# Patient Record
Sex: MALE | Race: OTHER | HISPANIC OR LATINO | URBAN - METROPOLITAN AREA
[De-identification: names, ages, dates, MRNs, and addresses within clinical notes are randomized per-mention and may not be internally consistent; named-entity substitution may affect disease eponyms.]

---

## 2017-12-01 ENCOUNTER — OUTPATIENT (OUTPATIENT)
Dept: OUTPATIENT SERVICES | Facility: HOSPITAL | Age: 67
LOS: 1 days | Discharge: HOME | End: 2017-12-01

## 2017-12-06 DIAGNOSIS — K75.2 NONSPECIFIC REACTIVE HEPATITIS: ICD-10-CM

## 2022-05-09 ENCOUNTER — APPOINTMENT (OUTPATIENT)
Dept: UROLOGY | Facility: CLINIC | Age: 72
End: 2022-05-09

## 2023-06-08 ENCOUNTER — APPOINTMENT (OUTPATIENT)
Dept: SURGERY | Facility: CLINIC | Age: 73
End: 2023-06-08
Payer: MEDICARE

## 2023-06-08 VITALS — SYSTOLIC BLOOD PRESSURE: 192 MMHG | DIASTOLIC BLOOD PRESSURE: 102 MMHG | HEART RATE: 66 BPM

## 2023-06-08 VITALS
HEIGHT: 63.5 IN | WEIGHT: 171 LBS | SYSTOLIC BLOOD PRESSURE: 201 MMHG | DIASTOLIC BLOOD PRESSURE: 101 MMHG | HEART RATE: 66 BPM | BODY MASS INDEX: 29.92 KG/M2

## 2023-06-08 DIAGNOSIS — E11.9 TYPE 2 DIABETES MELLITUS W/OUT COMPLICATIONS: ICD-10-CM

## 2023-06-08 DIAGNOSIS — E78.5 HYPERLIPIDEMIA, UNSPECIFIED: ICD-10-CM

## 2023-06-08 DIAGNOSIS — Z87.891 PERSONAL HISTORY OF NICOTINE DEPENDENCE: ICD-10-CM

## 2023-06-08 DIAGNOSIS — I10 ESSENTIAL (PRIMARY) HYPERTENSION: ICD-10-CM

## 2023-06-08 DIAGNOSIS — Z83.6 FAMILY HISTORY OF OTHER DISEASES OF THE RESPIRATORY SYSTEM: ICD-10-CM

## 2023-06-08 PROCEDURE — 99203 OFFICE O/P NEW LOW 30 MIN: CPT

## 2023-06-08 RX ORDER — SITAGLIPTIN AND METFORMIN HYDROCHLORIDE 50; 1000 MG/1; MG/1
TABLET, FILM COATED ORAL
Refills: 0 | Status: ACTIVE | COMMUNITY

## 2023-06-08 RX ORDER — LOSARTAN POTASSIUM 100 MG/1
TABLET, FILM COATED ORAL
Refills: 0 | Status: ACTIVE | COMMUNITY

## 2023-06-08 NOTE — PLAN
[FreeTextEntry1] : Mr. LIMA  was told significance of findings, options, risks and benefits were explained.  Informed consent for right inguinal hernia repair with mesh and umbilical hernia repair and potential risks, benefits and alternatives (surgical options were discussed including non-surgical options or the option of no surgery) to the planned surgery were discussed in depth.  All surgical options were discussed including non-surgical treatments.  He wishes to proceed with surgery.  We will plan for surgery on at the next available date, pending any required insurance pre-certification or pre-approval. He agrees to obtain any necessary pre-operative evaluations and testing prior to surgery.\par Patient advised to seek immediate medical attention with any acute change in symptoms or with the development of any new or worsening symptoms.  Patient's questions and concerns addressed to patient's satisfaction, and patient verbalized an understanding of the information discussed.\par \par

## 2023-06-08 NOTE — PHYSICAL EXAM
[Alert] : alert [Oriented to Person] : oriented to person [Oriented to Place] : oriented to place [Oriented to Time] : oriented to time [Calm] : calm [de-identified] : small reducible umbilical hernia,  reducible Right  inguinal hernia.  small left inguinal hernia  No penile discharge or lesions.  No scrotal swelling or discoloration. Testes descended bilaterally, smooth, without masses. Epididymis non-tender.

## 2023-06-08 NOTE — CONSULT LETTER
[Dear  ___] : Dear  [unfilled], [Consult Letter:] : I had the pleasure of evaluating your patient, [unfilled]. [Please see my note below.] : Please see my note below. [Consult Closing:] : Thank you very much for allowing me to participate in the care of this patient.  If you have any questions, please do not hesitate to contact me. [Sincerely,] : Sincerely, [FreeTextEntry3] : Ming Ordaz MD, FACS

## 2023-06-08 NOTE — ASSESSMENT
[FreeTextEntry1] : Impression: small reducible umbilical hernia- symptomatic \par   reducible Right  inguinal hernia - symptomatic \par   small left inguinal hernia - asymptomatic

## 2023-06-08 NOTE — HISTORY OF PRESENT ILLNESS
[de-identified] : Mr. ALESSANDRA LIMA is  a 73 year old male who was referred by Dr. Ellen Owusu with the chief complaint of having pain and swelling in his Right  groin.  He has had the condition for 3 months  and is worse when he is walking or standing.  He is stating that the  swelling  is getting bigger and symptomatic. He denies any fever or  night sweats. Appetite is good and weight is stable. \par  \par

## 2023-06-23 ENCOUNTER — OUTPATIENT (OUTPATIENT)
Dept: OUTPATIENT SERVICES | Facility: HOSPITAL | Age: 73
LOS: 1 days | End: 2023-06-23
Payer: COMMERCIAL

## 2023-06-23 VITALS
DIASTOLIC BLOOD PRESSURE: 84 MMHG | HEART RATE: 63 BPM | WEIGHT: 156.97 LBS | TEMPERATURE: 98 F | RESPIRATION RATE: 18 BRPM | SYSTOLIC BLOOD PRESSURE: 166 MMHG | OXYGEN SATURATION: 100 % | HEIGHT: 65 IN

## 2023-06-23 DIAGNOSIS — Z98.890 OTHER SPECIFIED POSTPROCEDURAL STATES: Chronic | ICD-10-CM

## 2023-06-23 DIAGNOSIS — K42.9 UMBILICAL HERNIA WITHOUT OBSTRUCTION OR GANGRENE: ICD-10-CM

## 2023-06-23 DIAGNOSIS — K59.00 CONSTIPATION, UNSPECIFIED: ICD-10-CM

## 2023-06-23 DIAGNOSIS — Z98.41 CATARACT EXTRACTION STATUS, RIGHT EYE: Chronic | ICD-10-CM

## 2023-06-23 DIAGNOSIS — K40.90 UNILATERAL INGUINAL HERNIA, WITHOUT OBSTRUCTION OR GANGRENE, NOT SPECIFIED AS RECURRENT: ICD-10-CM

## 2023-06-23 DIAGNOSIS — I10 ESSENTIAL (PRIMARY) HYPERTENSION: ICD-10-CM

## 2023-06-23 DIAGNOSIS — E11.40 TYPE 2 DIABETES MELLITUS WITH DIABETIC NEUROPATHY, UNSPECIFIED: ICD-10-CM

## 2023-06-23 DIAGNOSIS — E27.40 UNSPECIFIED ADRENOCORTICAL INSUFFICIENCY: ICD-10-CM

## 2023-06-23 DIAGNOSIS — E11.9 TYPE 2 DIABETES MELLITUS WITHOUT COMPLICATIONS: ICD-10-CM

## 2023-06-23 DIAGNOSIS — Z01.818 ENCOUNTER FOR OTHER PREPROCEDURAL EXAMINATION: ICD-10-CM

## 2023-06-23 DIAGNOSIS — M54.40 LUMBAGO WITH SCIATICA, UNSPECIFIED SIDE: ICD-10-CM

## 2023-06-23 LAB
ALBUMIN SERPL ELPH-MCNC: 4 G/DL — SIGNIFICANT CHANGE UP (ref 3.5–5)
ALP SERPL-CCNC: 68 U/L — SIGNIFICANT CHANGE UP (ref 40–120)
ALT FLD-CCNC: 24 U/L DA — SIGNIFICANT CHANGE UP (ref 10–60)
ANION GAP SERPL CALC-SCNC: 7 MMOL/L — SIGNIFICANT CHANGE UP (ref 5–17)
AST SERPL-CCNC: 20 U/L — SIGNIFICANT CHANGE UP (ref 10–40)
BILIRUB SERPL-MCNC: 0.8 MG/DL — SIGNIFICANT CHANGE UP (ref 0.2–1.2)
BUN SERPL-MCNC: 12 MG/DL — SIGNIFICANT CHANGE UP (ref 7–18)
CALCIUM SERPL-MCNC: 9.5 MG/DL — SIGNIFICANT CHANGE UP (ref 8.4–10.5)
CHLORIDE SERPL-SCNC: 96 MMOL/L — SIGNIFICANT CHANGE UP (ref 96–108)
CO2 SERPL-SCNC: 28 MMOL/L — SIGNIFICANT CHANGE UP (ref 22–31)
CREAT SERPL-MCNC: 0.84 MG/DL — SIGNIFICANT CHANGE UP (ref 0.5–1.3)
EGFR: 93 ML/MIN/1.73M2 — SIGNIFICANT CHANGE UP
GLUCOSE SERPL-MCNC: 124 MG/DL — HIGH (ref 70–99)
POTASSIUM SERPL-MCNC: 3.5 MMOL/L — SIGNIFICANT CHANGE UP (ref 3.5–5.3)
POTASSIUM SERPL-SCNC: 3.5 MMOL/L — SIGNIFICANT CHANGE UP (ref 3.5–5.3)
PROT SERPL-MCNC: 8 G/DL — SIGNIFICANT CHANGE UP (ref 6–8.3)
SODIUM SERPL-SCNC: 131 MMOL/L — LOW (ref 135–145)

## 2023-06-23 PROCEDURE — 36415 COLL VENOUS BLD VENIPUNCTURE: CPT

## 2023-06-23 PROCEDURE — G0463: CPT

## 2023-06-23 PROCEDURE — 80053 COMPREHEN METABOLIC PANEL: CPT

## 2023-06-23 RX ORDER — SODIUM CHLORIDE 9 MG/ML
3 INJECTION INTRAMUSCULAR; INTRAVENOUS; SUBCUTANEOUS EVERY 8 HOURS
Refills: 0 | Status: DISCONTINUED | OUTPATIENT
Start: 2023-06-28 | End: 2023-06-30

## 2023-06-23 NOTE — H&P PST ADULT - PROBLEM SELECTOR PLAN 8
Instructed to continue Gabapentin and take it with sips of water on day of surgery.   Follow-up with provider for management.

## 2023-06-23 NOTE — H&P PST ADULT - PROBLEM SELECTOR PLAN 4
Pt non adherent with medication.  Instructed to comply with medication, to continue antihypertensive med and take with sips of water on day of surgery.    Follow-up with PCP postop for management.

## 2023-06-23 NOTE — H&P PST ADULT - PROBLEM SELECTOR PLAN 3
Pt on Hydrocortisone 20 mg daily-  Last Sodium level 131, chloride 96, Potassium 3.5 on 6/23/2023-Na level increased from 128 of 6/2/2023.  Instructed to continue medication and to take it the morning of surgery.  As per PCP, he is optimized for surgery.

## 2023-06-23 NOTE — H&P PST ADULT - PROBLEM SELECTOR PLAN 1
Pt is scheduled for right inguinal hernia repair with mesh on 6/28/2023.   TATYANA stop bang score 4. Pt denies history of TATYANA, never did sleep study.  Preoperative instructions discussed with pt via Slovenian speaking  Luis M Mcmullen ID#[id number]. Kiswahili copy of instructions given to pt.   Instructed pt to notify security when he arrives in the lobby of the hospital that he is here for surgery, that he will need someone to come to the hospital to pick him up after surgery, not to eat or drink anything after midnight the night before the surgery, to avoid NSAIDs such as Ibuprofen, Motrin, Aleve, Advil, naproxen before surgery, to take Tylenol if needed for pain, to report if he has been exposed to anyone with any contagious diseases including Covid-19 and Monkeypox or if he is exhibiting any symptoms of COVID-19 or has any rash or if he is exhibiting any symptoms of COVID-19.    Instructed about use of Chlorhexidine 4% soap for 3 days before surgery including the morning of the surgery to prevent infection. Verbalized understanding of instructions given.

## 2023-06-23 NOTE — H&P PST ADULT - PSYCHIATRIC
details… normal/normal affect/alert and oriented x3/normal behavior normal/alert and oriented x3/normal behavior/anxious

## 2023-06-23 NOTE — H&P PST ADULT - ENDOCRINE COMMENTS
Diabetes on Janumet-last HgA1C 6.4; adrenal hypofunction on Hydrocortisone Diabetes on Janumet-last HgA1C 6.4; adrenal hypofunction on Hydrocortisone 20 mg daily-last  on 6/2/2023-

## 2023-06-23 NOTE — H&P PST ADULT - NEGATIVE ALLERGY TYPES
no indoor environmental allergies/no reactions to food/no reactions to animals/no reactions to insect bites

## 2023-06-23 NOTE — H&P PST ADULT - NSICDXPROCEDURE_GEN_ALL_CORE_FT
PROCEDURES:  Repair, hernia, inguinal, open, using mesh, adult 23-Jun-2023 15:31:45  Nicolette Kidd  Laparoscopic repair of inguinal hernia with repair of umbilical hernia 23-Jun-2023 15:36:27  Nicolette Kidd

## 2023-06-23 NOTE — H&P PST ADULT - NSICDXPASTMEDICALHX_GEN_ALL_CORE_FT
PAST MEDICAL HISTORY:  Chronic radicular pain of lower back     CN (constipation)     Diabetic neuropathy     DM (diabetes mellitus)     HTN (hypertension)     Lumbar herniated disc     Non-recurrent unilateral inguinal hernia without obstruction or gangrene     Sciatica of left side     Seasonal allergies     Umbilical hernia without obstruction or gangrene      PAST MEDICAL HISTORY:  Adrenal hypofunction     Chronic radicular pain of lower back     CN (constipation)     Diabetic neuropathy     DM (diabetes mellitus)     HTN (hypertension)     Lumbar herniated disc     Non-recurrent unilateral inguinal hernia without obstruction or gangrene     Sciatica of left side     Seasonal allergies     Umbilical hernia without obstruction or gangrene

## 2023-06-23 NOTE — H&P PST ADULT - PROBLEM SELECTOR PLAN 5
Pt on Janumet-last HgA1C 6.4 ON 6/2/2023.  Instructed to continue antidiabetic med-Janumet and to hold it on day of surgery.   Perioperative glucose monitoring and coverage as needed.   Follow-up with PCP for diabetic management.

## 2023-06-23 NOTE — H&P PST ADULT - HISTORY OF PRESENT ILLNESS
This is a 73 year old  male with PMH of presents with c/o intermittent right inguinal pain and due to hernia. Pt reports worsening of pain with activity. pt also reports umbilical repaair. Pt denies any discomfort. Pt for right inguinal hernia repair with mesh and umbilical hernia repair on 6/28/2023.  This is a 73 year old  male with PMH of presents with c/o intermittent right inguinal pain and due to hernia. Pt reports worsening of pain with activity. Pt also reports umbilical hernia. Pt denies any discomfort. Pt is scheduled for right inguinal hernia repair with mesh and umbilical hernia repair on 6/28/2023.  This is a 73 year old  male with PMH of Hypertension, Diabetes on Janumet-last HgA1C unknown, diabetic neuropathy, adrenal hypofunction on Hydrocortisone 20 mg daily-last Sodium level 131, chloride 96, Potassium 3.5 on 6/23/2023-Na level increased from 128 of 6/2/2023, constipation,  lumbar herniated discs with radiculopathy, seasonal allergies, presents with c/o intermittent right inguinal pain due to hernia. Pt reports worsening of pain with activity. Pt also reports umbilical hernia. Pt denies any discomfort. Pt is scheduled for right inguinal hernia repair with mesh and umbilical hernia repair on 6/28/2023.

## 2023-06-23 NOTE — H&P PST ADULT - NSICDXFAMILYHX_GEN_ALL_CORE_FT
FAMILY HISTORY:  Father  Still living? No  Family history of lung disease, Age at diagnosis: Age Unknown    Mother  Still living? No  Family history of cardiomegaly, Age at diagnosis: Age Unknown    Sibling  Still living? Unknown  History of hypertension in sibling, Age at diagnosis: Age Unknown

## 2023-06-23 NOTE — H&P PST ADULT - NSICDXPASTSURGICALHX_GEN_ALL_CORE_FT
PAST SURGICAL HISTORY:  H/O hemorrhoidectomy      PAST SURGICAL HISTORY:  H/O bilateral cataract extraction     H/O hemorrhoidectomy     H/O: hemorrhoidectomy     History of circumcision     History of testicular mass excision     History of varicose vein stripping

## 2023-06-28 ENCOUNTER — INPATIENT (INPATIENT)
Facility: HOSPITAL | Age: 73
LOS: 1 days | Discharge: HOME | DRG: 351 | End: 2023-06-30
Attending: SPECIALIST | Admitting: SPECIALIST
Payer: COMMERCIAL

## 2023-06-28 ENCOUNTER — TRANSCRIPTION ENCOUNTER (OUTPATIENT)
Age: 73
End: 2023-06-28

## 2023-06-28 ENCOUNTER — APPOINTMENT (OUTPATIENT)
Dept: SURGERY | Facility: HOSPITAL | Age: 73
End: 2023-06-28

## 2023-06-28 VITALS
RESPIRATION RATE: 16 BRPM | SYSTOLIC BLOOD PRESSURE: 177 MMHG | DIASTOLIC BLOOD PRESSURE: 93 MMHG | HEART RATE: 90 BPM | TEMPERATURE: 98 F | OXYGEN SATURATION: 100 % | HEIGHT: 65 IN | WEIGHT: 156.97 LBS

## 2023-06-28 DIAGNOSIS — K40.90 UNILATERAL INGUINAL HERNIA, WITHOUT OBSTRUCTION OR GANGRENE, NOT SPECIFIED AS RECURRENT: ICD-10-CM

## 2023-06-28 DIAGNOSIS — Z98.890 OTHER SPECIFIED POSTPROCEDURAL STATES: Chronic | ICD-10-CM

## 2023-06-28 DIAGNOSIS — Z01.818 ENCOUNTER FOR OTHER PREPROCEDURAL EXAMINATION: ICD-10-CM

## 2023-06-28 DIAGNOSIS — Z98.41 CATARACT EXTRACTION STATUS, RIGHT EYE: Chronic | ICD-10-CM

## 2023-06-28 DIAGNOSIS — K42.9 UMBILICAL HERNIA WITHOUT OBSTRUCTION OR GANGRENE: ICD-10-CM

## 2023-06-28 LAB
ANION GAP SERPL CALC-SCNC: 11 MMOL/L — SIGNIFICANT CHANGE UP (ref 5–17)
BUN SERPL-MCNC: 13 MG/DL — SIGNIFICANT CHANGE UP (ref 7–18)
CALCIUM SERPL-MCNC: 9.3 MG/DL — SIGNIFICANT CHANGE UP (ref 8.4–10.5)
CHLORIDE SERPL-SCNC: 91 MMOL/L — LOW (ref 96–108)
CO2 SERPL-SCNC: 28 MMOL/L — SIGNIFICANT CHANGE UP (ref 22–31)
CREAT SERPL-MCNC: 0.87 MG/DL — SIGNIFICANT CHANGE UP (ref 0.5–1.3)
EGFR: 92 ML/MIN/1.73M2 — SIGNIFICANT CHANGE UP
GLUCOSE BLDC GLUCOMTR-MCNC: 147 MG/DL — HIGH (ref 70–99)
GLUCOSE BLDC GLUCOMTR-MCNC: 155 MG/DL — HIGH (ref 70–99)
GLUCOSE SERPL-MCNC: 161 MG/DL — HIGH (ref 70–99)
POTASSIUM SERPL-MCNC: 3.4 MMOL/L — LOW (ref 3.5–5.3)
POTASSIUM SERPL-SCNC: 3.4 MMOL/L — LOW (ref 3.5–5.3)
SODIUM SERPL-SCNC: 130 MMOL/L — LOW (ref 135–145)

## 2023-06-28 PROCEDURE — 49505 PRP I/HERN INIT REDUC >5 YR: CPT | Mod: AS,RT

## 2023-06-28 PROCEDURE — 49591 RPR AA HRN 1ST < 3 CM RDC: CPT | Mod: AS

## 2023-06-28 DEVICE — MESH HERNIA MARLEX 3 X 6": Type: IMPLANTABLE DEVICE | Site: RIGHT INGUINAL HERNIA REPAIR WITH MESH, UMBILICAL HERNIA REPAIR | Status: FUNCTIONAL

## 2023-06-28 RX ORDER — HYDROCORTISONE 20 MG
1 TABLET ORAL
Refills: 0 | DISCHARGE

## 2023-06-28 RX ORDER — HYDROMORPHONE HYDROCHLORIDE 2 MG/ML
1 INJECTION INTRAMUSCULAR; INTRAVENOUS; SUBCUTANEOUS
Refills: 0 | Status: DISCONTINUED | OUTPATIENT
Start: 2023-06-28 | End: 2023-06-28

## 2023-06-28 RX ORDER — ACETAMINOPHEN 500 MG
2 TABLET ORAL
Refills: 0 | DISCHARGE

## 2023-06-28 RX ORDER — ONDANSETRON 8 MG/1
4 TABLET, FILM COATED ORAL ONCE
Refills: 0 | Status: DISCONTINUED | OUTPATIENT
Start: 2023-06-28 | End: 2023-06-28

## 2023-06-28 RX ORDER — SOD,AMMONIUM,POTASSIUM LACTATE
1 CREAM (GRAM) TOPICAL
Refills: 0 | DISCHARGE

## 2023-06-28 RX ORDER — HYDROCORTISONE 20 MG
100 TABLET ORAL ONCE
Refills: 0 | Status: COMPLETED | OUTPATIENT
Start: 2023-06-28 | End: 2023-06-28

## 2023-06-28 RX ORDER — SODIUM CHLORIDE 9 MG/ML
1000 INJECTION, SOLUTION INTRAVENOUS
Refills: 0 | Status: DISCONTINUED | OUTPATIENT
Start: 2023-06-28 | End: 2023-06-28

## 2023-06-28 RX ORDER — GABAPENTIN 400 MG/1
1 CAPSULE ORAL
Refills: 0 | DISCHARGE

## 2023-06-28 RX ORDER — HYDROMORPHONE HYDROCHLORIDE 2 MG/ML
0.5 INJECTION INTRAMUSCULAR; INTRAVENOUS; SUBCUTANEOUS
Refills: 0 | Status: DISCONTINUED | OUTPATIENT
Start: 2023-06-28 | End: 2023-06-28

## 2023-06-28 RX ORDER — PREGABALIN 225 MG/1
1 CAPSULE ORAL
Refills: 0 | DISCHARGE

## 2023-06-28 RX ORDER — SITAGLIPTIN AND METFORMIN HYDROCHLORIDE 500; 50 MG/1; MG/1
1 TABLET, FILM COATED ORAL
Refills: 0 | DISCHARGE

## 2023-06-28 RX ORDER — IBUPROFEN 200 MG
400 TABLET ORAL EVERY 6 HOURS
Refills: 0 | Status: DISCONTINUED | OUTPATIENT
Start: 2023-06-28 | End: 2023-06-29

## 2023-06-28 RX ORDER — PLECANATIDE 3 MG/1
1 TABLET ORAL
Refills: 0 | DISCHARGE

## 2023-06-28 RX ORDER — OXYCODONE HYDROCHLORIDE 5 MG/1
1 TABLET ORAL
Qty: 5 | Refills: 0
Start: 2023-06-28

## 2023-06-28 RX ORDER — LOSARTAN POTASSIUM 100 MG/1
1 TABLET, FILM COATED ORAL
Refills: 0 | DISCHARGE

## 2023-06-28 RX ORDER — METOPROLOL TARTRATE 50 MG
1 TABLET ORAL
Refills: 0 | DISCHARGE

## 2023-06-28 RX ADMIN — HYDROMORPHONE HYDROCHLORIDE 0.5 MILLIGRAM(S): 2 INJECTION INTRAMUSCULAR; INTRAVENOUS; SUBCUTANEOUS at 14:49

## 2023-06-28 RX ADMIN — SODIUM CHLORIDE 3 MILLILITER(S): 9 INJECTION INTRAMUSCULAR; INTRAVENOUS; SUBCUTANEOUS at 14:25

## 2023-06-28 RX ADMIN — Medication 100 MILLIGRAM(S): at 14:25

## 2023-06-28 RX ADMIN — HYDROMORPHONE HYDROCHLORIDE 0.5 MILLIGRAM(S): 2 INJECTION INTRAMUSCULAR; INTRAVENOUS; SUBCUTANEOUS at 14:34

## 2023-06-28 RX ADMIN — SODIUM CHLORIDE 3 MILLILITER(S): 9 INJECTION INTRAMUSCULAR; INTRAVENOUS; SUBCUTANEOUS at 22:08

## 2023-06-28 NOTE — ASU DISCHARGE PLAN (ADULT/PEDIATRIC) - NS MD DC FALL RISK RISK
Med rec updated and complete. Allergies reviewed. Pt was unable to recall all her medications. Pt confirmed last doses taken. Placed call to family ( daughter ) to confirm medications. No antibiotic use in last 30 days.      Home pharmacy [facility] [phone]   For information on Fall & Injury Prevention, visit: [email or web address] OR  [email or web address] OR  https://www.cdc.gov/steadi/patient.html

## 2023-06-28 NOTE — BRIEF OPERATIVE NOTE - NSICDXBRIEFPOSTOP_GEN_ALL_CORE_FT
POST-OP DIAGNOSIS:  Right inguinal hernia 28-Jun-2023 14:05:51  Vidhi Antunez  Umbilical hernia 28-Jun-2023 14:05:45  Vidhi Antunez

## 2023-06-28 NOTE — ASU DISCHARGE PLAN (ADULT/PEDIATRIC) - ASU DC SPECIAL INSTRUCTIONSFT
Please follow-up with your surgeon in 1 week. Drink plenty of fluids and rest as needed. Call for any fever over 101, nausea, vomiting, severe pain, no passing of gas or bowel movement.     DIET   You may resume your regular diet as normal.     SURGICAL SITES   Remove outer dressing and keep white steri-strips in place allowing them to fall off on their own. You may shower 48 hours post-operatively but do not bathe or soak in the water for 1-2 weeks; pat dry. If you notice any signs of surgical site infection (ie. redness, swelling, pain, pus drainage), please seek medical care immediately.    ACTIVITY Do not lift anything heavier than 10 pounds for 2-3 months for hernia, no exercise for 2 weeks and avoid strenuous activity for 4-6 weeks.       HYDROCORTISONE   please take hydrocortisone as prescribed by PCP starting 6/29  200 mg IV hydrocortisone given 6/28 as per discussion with Dr. Ordaz and anesthesia   please follow up with your PCP 6/29 to discuss further planning     PAIN CONTROL   You may take Motrin 600mg (with food) or Tylenol 650mg as needed for mild pain. Stagger one medication 3 hours after the other for maximum pain control. Maximum daily dose of Tylenol should not exceed 4grams/day.  You may take your prescribed oxycodone for severe breakthrough pain not that is not relieved by Tylenol/Motrin. Do not drive or make important decisions while taking this medication and do not take more than 4 pills in 24 hours.Please refer to medical records/ progress notes for in depth hospital course. Discharge plan discussed and agreed with attending.

## 2023-06-28 NOTE — ASU PATIENT PROFILE, ADULT - PATIENT REPRESENTATIVE NAME
----- Message from Connie Barone MD sent at 8/8/2019  5:42 PM CDT -----  Call patient notify   Urine culture did grew out multiple organisms, but was consistent with contamination.  If still having symptoms, would like to repeat urine culture.  If symptoms are severe or significantly worsening please let me know.  Cholesterol is elevated at 261 with an LDL of 171, increased risk of heart disease.  The 10-year CVD risk score (D'Agostino, et al., 2008) is: 17.2%.  Would recommend starting on a statin to decrease cholesterol as well as decrease risk of heart disease and stroke.  Would recommend starting on atorvastatin 20 mg daily, please send script for number 30 and 6 refills to pharmacy of choice.  Would recommend repeating lipids in 6-8 weeks after starting medication.  Would also recommend low-fat low-cholesterol diet and exercise as well as weight loss.  Thyroid is normal.  Continue on current dose of levothyroxine.  Please send refill for 1 year to pharmacy of choice.  Magnesium level is elevated, uncertain why this is elevated.  Please find out if she is on any magnesium supplement, if she is she may want to decrease the dose.     Melissa spouse

## 2023-06-28 NOTE — BRIEF OPERATIVE NOTE - NSICDXBRIEFPREOP_GEN_ALL_CORE_FT
PRE-OP DIAGNOSIS:  Umbilical hernia 28-Jun-2023 14:05:28  Vidhi Antunez  Right inguinal hernia 28-Jun-2023 14:05:37  Vidhi Antunez

## 2023-06-28 NOTE — CHART NOTE - NSCHARTNOTEFT_GEN_A_CORE
Surgery called for pt near syncope episode in the bathroom of [facility].    Patient seen and examined at bed side.  Pt well appearing, resting comfortably in bed. Pt states he felt very dizzy and nearly fainted in the bathroom. Pts wife was there and she caught the patient, no fall or trauma to the head.  Pt states he is feeling much better now, he has no dizziness, weakness, or feeling like he is going to pass out.      Exam:  Constitutional: Well appearing, NAD  Respiratory: non-labored, no accessory muscle use  Cardiovascular: radial pulses palpated with pulses strong, regular  Skin: Non-diaphoretic  Abdomen: hernia surgical site C/D/I, abdomen soft, non distended, non tender to palpation  Extremities: without edema      Plan:   -likely vasovagal syncope  -admit for observation  -consult medicine, inputs appreciated  -IVF  -regular diet  -continue home meds

## 2023-06-28 NOTE — ASU PATIENT PROFILE, ADULT - NSICDXPASTMEDICALHX_GEN_ALL_CORE_FT
PAST MEDICAL HISTORY:  Adrenal hypofunction     Chronic radicular pain of lower back     CN (constipation)     Diabetic neuropathy     DM (diabetes mellitus)     HTN (hypertension)     Lumbar herniated disc     Non-recurrent unilateral inguinal hernia without obstruction or gangrene     Sciatica of left side     Seasonal allergies     Umbilical hernia without obstruction or gangrene

## 2023-06-28 NOTE — ASU PATIENT PROFILE, ADULT - NSICDXPASTSURGICALHX_GEN_ALL_CORE_FT
PAST SURGICAL HISTORY:  H/O bilateral cataract extraction     H/O hemorrhoidectomy     H/O: hemorrhoidectomy     History of circumcision     History of testicular mass excision     History of varicose vein stripping

## 2023-06-28 NOTE — PATIENT PROFILE ADULT - FALL HARM RISK - RISK INTERVENTIONS
Assistance OOB with selected safe patient handling equipment/Assistance with ambulation/Communicate Fall Risk and Risk Factors to all staff, patient, and family/Monitor gait and stability/Reinforce activity limits and safety measures with patient and family/Sit up slowly, dangle for a short time, stand at bedside before walking/Use of alarms - bed, chair and/or voice tab/Visual Cue: Yellow wristband/Bed in lowest position, wheels locked, appropriate side rails in place/Call bell, personal items and telephone in reach/Instruct patient to call for assistance before getting out of bed or chair/Non-slip footwear when patient is out of bed/[place] to call system/Physically safe environment - no spills, clutter or unnecessary equipment/Purposeful Proactive Rounding/Room/bathroom lighting operational, light cord in reach

## 2023-06-28 NOTE — ASU DISCHARGE PLAN (ADULT/PEDIATRIC) - CARE PROVIDER_API CALL
Ming Ordaz  Surgery  [address]  [place] [address]  Phone: [phone]  Fax: [phone]  Follow Up Time: 2 weeks

## 2023-06-28 NOTE — CHART NOTE - NSCHARTNOTEFT_GEN_A_CORE
Notified by RN that pt is bleeding from incision upon ambulating. Pt seen and examined at bedside. Dressing and steri strips completely saturated. Taken down. Incision appears well approximated, bleeding coagulated. Pt cleaned. Steri strips replaced. New pressure dressing applied with gauze and tape. Ice pack applied to the area. Pt denies abdominal pain, scrotal pain. Both testicles palpated. Pt encouraged to minimize activity for the next 2 weeks. Pt encouraged to utilize ice packs to the area. Discussed with Dr. Ordaz. Pt to remain in [facility] for 1 hour. Check incision dressing q15 minutes.

## 2023-06-28 NOTE — BRIEF OPERATIVE NOTE - NSICDXBRIEFPROCEDURE_GEN_ALL_CORE_FT
PROCEDURES:  Repair, hernia, inguinal, open, using mesh, adult 28-Jun-2023 14:04:34 RIGHT Vidhi Antunez  Repair, hernia, umbilical, with lipectomy 28-Jun-2023 14:05:07 without lipectomy Vidhi Antunez

## 2023-06-28 NOTE — ASU PATIENT PROFILE, ADULT - FALL HARM RISK - UNIVERSAL INTERVENTIONS
Bed in lowest position, wheels locked, appropriate side rails in place/Call bell, personal items and telephone in reach/Instruct patient to call for assistance before getting out of bed or chair/Non-slip footwear when patient is out of bed/[place] to call system/Physically safe environment - no spills, clutter or unnecessary equipment/Purposeful Proactive Rounding/Room/bathroom lighting operational, light cord in reach

## 2023-06-29 ENCOUNTER — TRANSCRIPTION ENCOUNTER (OUTPATIENT)
Age: 73
End: 2023-06-29

## 2023-06-29 DIAGNOSIS — N17.9 ACUTE KIDNEY FAILURE, UNSPECIFIED: ICD-10-CM

## 2023-06-29 DIAGNOSIS — R55 SYNCOPE AND COLLAPSE: ICD-10-CM

## 2023-06-29 DIAGNOSIS — E27.40 UNSPECIFIED ADRENOCORTICAL INSUFFICIENCY: ICD-10-CM

## 2023-06-29 DIAGNOSIS — E11.9 TYPE 2 DIABETES MELLITUS WITHOUT COMPLICATIONS: ICD-10-CM

## 2023-06-29 DIAGNOSIS — I10 ESSENTIAL (PRIMARY) HYPERTENSION: ICD-10-CM

## 2023-06-29 DIAGNOSIS — Z98.890 OTHER SPECIFIED POSTPROCEDURAL STATES: ICD-10-CM

## 2023-06-29 LAB
ANION GAP SERPL CALC-SCNC: 10 MMOL/L — SIGNIFICANT CHANGE UP (ref 5–17)
ANION GAP SERPL CALC-SCNC: 11 MMOL/L — SIGNIFICANT CHANGE UP (ref 5–17)
BUN SERPL-MCNC: 22 MG/DL — HIGH (ref 7–18)
BUN SERPL-MCNC: 32 MG/DL — HIGH (ref 7–18)
CALCIUM SERPL-MCNC: 8.4 MG/DL — SIGNIFICANT CHANGE UP (ref 8.4–10.5)
CALCIUM SERPL-MCNC: 8.7 MG/DL — SIGNIFICANT CHANGE UP (ref 8.4–10.5)
CHLORIDE SERPL-SCNC: 91 MMOL/L — LOW (ref 96–108)
CHLORIDE SERPL-SCNC: 93 MMOL/L — LOW (ref 96–108)
CO2 SERPL-SCNC: 27 MMOL/L — SIGNIFICANT CHANGE UP (ref 22–31)
CO2 SERPL-SCNC: 28 MMOL/L — SIGNIFICANT CHANGE UP (ref 22–31)
CREAT SERPL-MCNC: 1.55 MG/DL — HIGH (ref 0.5–1.3)
CREAT SERPL-MCNC: 2.45 MG/DL — HIGH (ref 0.5–1.3)
EGFR: 27 ML/MIN/1.73M2 — LOW
EGFR: 47 ML/MIN/1.73M2 — LOW
GLUCOSE BLDC GLUCOMTR-MCNC: 149 MG/DL — HIGH (ref 70–99)
GLUCOSE BLDC GLUCOMTR-MCNC: 172 MG/DL — HIGH (ref 70–99)
GLUCOSE SERPL-MCNC: 196 MG/DL — HIGH (ref 70–99)
GLUCOSE SERPL-MCNC: 223 MG/DL — HIGH (ref 70–99)
HCT VFR BLD CALC: 29 % — LOW (ref 39–50)
HGB BLD-MCNC: 10.6 G/DL — LOW (ref 13–17)
MAGNESIUM SERPL-MCNC: 2 MG/DL — SIGNIFICANT CHANGE UP (ref 1.6–2.6)
MCHC RBC-ENTMCNC: 31.5 PG — SIGNIFICANT CHANGE UP (ref 27–34)
MCHC RBC-ENTMCNC: 36.6 GM/DL — HIGH (ref 32–36)
MCV RBC AUTO: 86.1 FL — SIGNIFICANT CHANGE UP (ref 80–100)
NRBC # BLD: 0 /100 WBCS — SIGNIFICANT CHANGE UP (ref 0–0)
PHOSPHATE SERPL-MCNC: 4 MG/DL — SIGNIFICANT CHANGE UP (ref 2.5–4.5)
PLATELET # BLD AUTO: 183 K/UL — SIGNIFICANT CHANGE UP (ref 150–400)
POTASSIUM SERPL-MCNC: 3.9 MMOL/L — SIGNIFICANT CHANGE UP (ref 3.5–5.3)
POTASSIUM SERPL-MCNC: 3.9 MMOL/L — SIGNIFICANT CHANGE UP (ref 3.5–5.3)
POTASSIUM SERPL-SCNC: 3.9 MMOL/L — SIGNIFICANT CHANGE UP (ref 3.5–5.3)
POTASSIUM SERPL-SCNC: 3.9 MMOL/L — SIGNIFICANT CHANGE UP (ref 3.5–5.3)
RBC # BLD: 3.37 M/UL — LOW (ref 4.2–5.8)
RBC # FLD: 12.1 % — SIGNIFICANT CHANGE UP (ref 10.3–14.5)
SODIUM SERPL-SCNC: 129 MMOL/L — LOW (ref 135–145)
SODIUM SERPL-SCNC: 131 MMOL/L — LOW (ref 135–145)
WBC # BLD: 16.35 K/UL — HIGH (ref 3.8–10.5)
WBC # FLD AUTO: 16.35 K/UL — HIGH (ref 3.8–10.5)

## 2023-06-29 RX ORDER — IBUPROFEN 200 MG
1 TABLET ORAL
Qty: 0 | Refills: 0 | DISCHARGE
Start: 2023-06-29

## 2023-06-29 RX ORDER — SODIUM CHLORIDE 9 MG/ML
1000 INJECTION, SOLUTION INTRAVENOUS
Refills: 0 | Status: DISCONTINUED | OUTPATIENT
Start: 2023-06-29 | End: 2023-06-30

## 2023-06-29 RX ORDER — DEXTROSE 50 % IN WATER 50 %
25 SYRINGE (ML) INTRAVENOUS ONCE
Refills: 0 | Status: DISCONTINUED | OUTPATIENT
Start: 2023-06-29 | End: 2023-06-30

## 2023-06-29 RX ORDER — SODIUM CHLORIDE 9 MG/ML
500 INJECTION INTRAMUSCULAR; INTRAVENOUS; SUBCUTANEOUS ONCE
Refills: 0 | Status: COMPLETED | OUTPATIENT
Start: 2023-06-29 | End: 2023-06-29

## 2023-06-29 RX ORDER — GLUCAGON INJECTION, SOLUTION 0.5 MG/.1ML
1 INJECTION, SOLUTION SUBCUTANEOUS ONCE
Refills: 0 | Status: DISCONTINUED | OUTPATIENT
Start: 2023-06-29 | End: 2023-06-30

## 2023-06-29 RX ORDER — INSULIN LISPRO 100/ML
VIAL (ML) SUBCUTANEOUS
Refills: 0 | Status: DISCONTINUED | OUTPATIENT
Start: 2023-06-29 | End: 2023-06-30

## 2023-06-29 RX ORDER — DEXTROSE 50 % IN WATER 50 %
15 SYRINGE (ML) INTRAVENOUS ONCE
Refills: 0 | Status: DISCONTINUED | OUTPATIENT
Start: 2023-06-29 | End: 2023-06-30

## 2023-06-29 RX ORDER — HYDROCORTISONE 20 MG
20 TABLET ORAL DAILY
Refills: 0 | Status: DISCONTINUED | OUTPATIENT
Start: 2023-06-29 | End: 2023-06-30

## 2023-06-29 RX ORDER — DEXTROSE 50 % IN WATER 50 %
12.5 SYRINGE (ML) INTRAVENOUS ONCE
Refills: 0 | Status: DISCONTINUED | OUTPATIENT
Start: 2023-06-29 | End: 2023-06-30

## 2023-06-29 RX ADMIN — Medication 20 MILLIGRAM(S): at 18:12

## 2023-06-29 RX ADMIN — SODIUM CHLORIDE 250 MILLILITER(S): 9 INJECTION INTRAMUSCULAR; INTRAVENOUS; SUBCUTANEOUS at 16:50

## 2023-06-29 RX ADMIN — SODIUM CHLORIDE 3 MILLILITER(S): 9 INJECTION INTRAMUSCULAR; INTRAVENOUS; SUBCUTANEOUS at 13:51

## 2023-06-29 RX ADMIN — SODIUM CHLORIDE 3 MILLILITER(S): 9 INJECTION INTRAMUSCULAR; INTRAVENOUS; SUBCUTANEOUS at 21:17

## 2023-06-29 RX ADMIN — Medication 1: at 16:50

## 2023-06-29 NOTE — DISCHARGE NOTE PROVIDER - HOSPITAL COURSE
HPI:  73 year old  male with PMH of Hypertension, Diabetes on Janumet-last HgA1C unknown, diabetic neuropathy, adrenal hypofunction on Hydrocortisone 20 mg daily-last Sodium level 131, chloride 96, Potassium 3.5 on 6/23/2023-Na level increased from 128 of 6/2/2023, constipation,  lumbar herniated discs with radiculopathy, seasonal allergies, present to [facility] for elective right inguinal hernia repair and umbilical hernia repair. Pt reports worsening of pain with activity. Pt also reports umbilical hernia. Pt denies any discomfort. Pt is scheduled for right inguinal hernia repair with mesh and umbilical hernia repair on 6/28/2023.     Patient underwent right inguinal hernia repair and umbilical hernia repair on 6/28/23. The procedure was uncomplicated and well tolerated by the patient. The post-operative course was complicated with patient having incisional bleeding at the hernia repair site as well as near syncope episode. The patient was admitted to surgical team for observation, with resolution of all symptoms by the next morning. Diet was advanced as tolerated and according to bowel function, and pain was well controlled on medication. At the time of discharge, the patient was hemodynamically stable, was tolerating PO diet, was voiding urine, was ambulating, and was comfortable with adequate pain control. The patient was instructed to follow up with Dr. Ordaz within 1-2 weeks after discharge from the hospital. The patient/family felt comfortable with discharge. The patient is stable and ready for discharge to home. The patient had no other issues.

## 2023-06-29 NOTE — CONSULT NOTE ADULT - SUBJECTIVE AND OBJECTIVE BOX
Patient is a 73y old  Male who presents with a chief complaint of S/p RIH repair with incisional bleed and near-syncope (29 Jun 2023 09:19)      History of Present Illness	  This is a 73 year old  male with PMH of Hypertension, Diabetes on Janumet-last HgA1C unknown, diabetic neuropathy, adrenal hypofunction on Hydrocortisone 20 mg daily-last Sodium level 131, chloride 96, Potassium 3.5 on 6/23/2023-Na level increased from 128 of 6/2/2023, constipation,  lumbar herniated discs with radiculopathy, seasonal allergies, presents with c/o intermittent right inguinal pain due to hernia. Pt reports worsening of pain with activity. Pt also reports umbilical hernia. Pt denies any discomfort. Pt is scheduled for right inguinal hernia repair with mesh and umbilical hernia repair on 6/28/2023.   Awake, alert, comfortable in bed in NAD  INTERVAL HPI/OVERNIGHT EVENTS:  T(C): 36.9 (06-29-23 @ 05:50), Max: 37.1 (06-29-23 @ 00:18)  HR: 80 (06-29-23 @ 05:50) (67 - 100)  BP: 106/64 (06-29-23 @ 05:50) (105/71 - 177/93)  RR: 18 (06-29-23 @ 05:50) (11 - 19)  SpO2: 100% (06-29-23 @ 05:50) (97% - 100%)  Wt(kg): --  I&O's Summary    28 Jun 2023 07:01  -  29 Jun 2023 07:00  --------------------------------------------------------  IN: 900 mL / OUT: 400 mL / NET: 500 mL        PAST MEDICAL & SURGICAL HISTORY:  Umbilical hernia without obstruction or gangrene      Non-recurrent unilateral inguinal hernia without obstruction or gangrene      HTN (hypertension)      DM (diabetes mellitus)      Sciatica of left side      Chronic radicular pain of lower back      CN (constipation)      Seasonal allergies      Lumbar herniated disc      Diabetic neuropathy      Adrenal hypofunction      H/O hemorrhoidectomy      History of circumcision      H/O bilateral cataract extraction      H/O: hemorrhoidectomy      History of varicose vein stripping      History of testicular mass excision          SOCIAL HISTORY  Alcohol:  Tobacco:  Illicit substance use:      FAMILY HISTORY:      LABS:                        10.6   16.35 )-----------( 183      ( 29 Jun 2023 05:52 )             29.0     06-29    129<L>  |  91<L>  |  22<H>  ----------------------------<  196<H>  3.9   |  28  |  1.55<H>    Ca    8.7      29 Jun 2023 05:52  Phos  4.0     06-29  Mg     2.0     06-29        Urinalysis Basic - ( 29 Jun 2023 05:52 )    Color: x / Appearance: x / SG: x / pH: x  Gluc: 196 mg/dL / Ketone: x  / Bili: x / Urobili: x   Blood: x / Protein: x / Nitrite: x   Leuk Esterase: x / RBC: x / WBC x   Sq Epi: x / Non Sq Epi: x / Bacteria: x      CAPILLARY BLOOD GLUCOSE      POCT Blood Glucose.: 147 mg/dL (28 Jun 2023 13:54)  POCT Blood Glucose.: 155 mg/dL (28 Jun 2023 10:47)        Urinalysis Basic - ( 29 Jun 2023 05:52 )    Color: x / Appearance: x / SG: x / pH: x  Gluc: 196 mg/dL / Ketone: x  / Bili: x / Urobili: x   Blood: x / Protein: x / Nitrite: x   Leuk Esterase: x / RBC: x / WBC x   Sq Epi: x / Non Sq Epi: x / Bacteria: x        MEDICATIONS  (STANDING):  sodium chloride 0.9% lock flush 3 milliLiter(s) IV Push every 8 hours    MEDICATIONS  (PRN):  ibuprofen  Tablet. 400 milliGRAM(s) Oral every 6 hours PRN Moderate Pain (4 - 6)  oxycodone    5 mG/acetaminophen 325 mG 1 Tablet(s) Oral every 6 hours PRN Severe Pain (7 - 10)      REVIEW OF SYSTEMS:  CONSTITUTIONAL: No fever, weight loss, or fatigue  EYES: No eye pain, visual disturbances, or discharge  ENMT:  No difficulty hearing, tinnitus, vertigo; No sinus or throat pain  NECK: No pain or stiffness  RESPIRATORY: No cough, wheezing, chills or hemoptysis; No shortness of breath  CARDIOVASCULAR: No chest pain, palpitations, dizziness, or leg swelling  GASTROINTESTINAL: No abdominal or epigastric pain. No nausea, vomiting, or hematemesis; No diarrhea or constipation. No melena or hematochezia.  GENITOURINARY: No dysuria, frequency, hematuria, or incontinence  NEUROLOGICAL: No headaches, memory loss, loss of strength, numbness, or tremors  SKIN: No itching, burning, rashes, or lesions   LYMPH NODES: No enlarged glands  ENDOCRINE: No heat or cold intolerance; No hair loss  MUSCULOSKELETAL: No joint pain or swelling; No muscle, back, or extremity pain  PSYCHIATRIC: No depression, anxiety, mood swings, or difficulty sleeping  HEME/LYMPH: No easy bruising, or bleeding gums  ALLERY AND IMMUNOLOGIC: No hives or eczema    PHYSICAL EXAM:  GENERAL: NAD, well-groomed, well-developed  HEAD:  Atraumatic, Normocephalic  EYES: EOMI, PERRLA, conjunctiva and sclera clear  ENMT: No tonsillar erythema, exudates, or enlargement; Moist mucous membranes, Good dentition, No lesions  NECK: Supple, No JVD, Normal thyroid  NERVOUS SYSTEM:  Alert & Oriented X3, Good concentration; Motor Strength 5/5 B/L upper and lower extremities; DTRs 2+ intact and symmetric  CHEST/LUNG: Clear to percussion bilaterally; No rales, rhonchi, wheezing, or rubs  HEART: Regular rate and rhythm; No murmurs, rubs, or gallops  ABDOMEN: Soft, Nontender, Nondistended; Bowel sounds present  EXTREMITIES:  2+ Peripheral Pulses, No clubbing, cyanosis, or edema  LYMPH: No lymphadenopathy noted  SKIN: No rashes or lesions    RADIOLOGY & ADDITIONAL TESTS:    Imaging Personally Reviewed:  [ x] YES  [ ] NO    Consultant(s) Notes Reviewed:  [x ] YES  [ ] NO        Care Discussed with Consultants/Other Providers [ x] YES  [ ] NO

## 2023-06-29 NOTE — DISCHARGE NOTE PROVIDER - CARE PROVIDER_API CALL
Ming Ordaz  Surgery  [address] [facility], [address]  [place] [address]  Phone: [phone]  Fax: [phone]  Follow Up Time:

## 2023-06-29 NOTE — CHART NOTE - NSCHARTNOTEFT_GEN_A_CORE
Pt with elevated Cr this morning, with normal baseline.   500cc IVF bolus given, BMP repeated at 14:00, noted to be uptrending.   Ibuprofen discontinued. Another 500cc IVF bolus ordered.   Will repeat BMP in AM. Will check bladder scan to r/o retention.   Discussed with Dr. Daley and Dr. Ordaz

## 2023-06-29 NOTE — DISCHARGE NOTE PROVIDER - NSDCCPCAREPLAN_GEN_ALL_CORE_FT
PRINCIPAL DISCHARGE DIAGNOSIS  Diagnosis: Right inguinal hernia  Assessment and Plan of Treatment:       SECONDARY DISCHARGE DIAGNOSES  Diagnosis: Umbilical hernia  Assessment and Plan of Treatment:

## 2023-06-29 NOTE — DISCHARGE NOTE PROVIDER - NSDCCPTREATMENT_GEN_ALL_CORE_FT
PRINCIPAL PROCEDURE  Procedure: Repair, hernia, inguinal, open, using mesh, adult  Findings and Treatment: RIGHT INGUINAL HERNIA REPAIR WITH MESH  Please follow-up with your surgeon in 1 week. Drink plenty of fluids and rest as needed. Call for any fever over 101, nausea, vomiting, severe pain, no passing of gas or bowel movement.   DIET  You may resume your regular diet as normal.   SURGICAL SITES  Remove outer dressing and keep white steri-strips in place allowing them to fall off on their own. You may shower 48 hours post-operatively but do not bathe or soak in the water for 1-2 weeks; pat dry. If you notice any signs of surgical site infection (ie. redness, swelling, pain, pus drainage), please seek medical care immediately.  ACTIVITY  Do not lift anything heavier than 10 pounds for 2 weeks (2-3 months for hernia, no exercise for 2 weeks) and avoid strenuous activity for 4-6 weeks.   PAIN CONTROL  You may take Motrin 600mg (with food) or Tylenol 650mg as needed for mild pain. Stagger one medication 3 hours after the other for maximum pain control. Maximum daily dose of Tylenol should not exceed 4grams/day.   Please refer to medical records/ progress notes for in depth hospital course. Discharge plan discussed and agreed with attending.        SECONDARY PROCEDURE  Procedure: Repair, hernia, inguinal, open, using mesh, adult  Findings and Treatment: RIGHT

## 2023-06-29 NOTE — DISCHARGE NOTE PROVIDER - NSDCMRMEDTOKEN_GEN_ALL_CORE_FT
ammonium lactate 12% topical lotion: Apply topically to affected area once a day  cyanocobalamin 100 mcg oral tablet: 1 orally  gabapentin 300 mg oral capsule: 1 cap(s) orally 2 times a day  hydrocortisone 20 mg oral tablet: 1 tab(s) orally once a day  Janumet 50 mg-1000 mg oral tablet: 1 tab(s) orally 2 times a day  losartan 25 mg oral tablet: 1 orally  metoprolol succinate 25 mg oral tablet, extended release: 1 tab(s) orally once a day  oxyCODONE 5 mg oral tablet: 1 tab(s) orally every 6 hours MDD: 4  Trulance 3 mg oral tablet: 1 tab(s) orally once a day as needed for  constipation  Tylenol 500 mg oral tablet: 2 tab(s) orally every 6 hours as needed for  moderate pain   ammonium lactate 12% topical lotion: Apply topically to affected area once a day  cyanocobalamin 100 mcg oral tablet: 1 orally  gabapentin 300 mg oral capsule: 1 cap(s) orally 2 times a day  hydrocortisone 20 mg oral tablet: 1 tab(s) orally once a day  ibuprofen 400 mg oral tablet: 1 tab(s) orally every 6 hours As needed Moderate Pain (4 - 6)  Janumet 50 mg-1000 mg oral tablet: 1 tab(s) orally 2 times a day  losartan 25 mg oral tablet: 1 orally  metoprolol succinate 25 mg oral tablet, extended release: 1 tab(s) orally once a day  oxyCODONE 5 mg oral tablet: 1 tab(s) orally every 6 hours MDD: 4  Trulance 3 mg oral tablet: 1 tab(s) orally once a day as needed for  constipation  Tylenol 500 mg oral tablet: 2 tab(s) orally every 6 hours as needed for  moderate pain   ammonium lactate 12% topical lotion: Apply topically to affected area once a day  cyanocobalamin 100 mcg oral tablet: 1 orally  gabapentin 300 mg oral capsule: 1 cap(s) orally 2 times a day  hydrocortisone 20 mg oral tablet: 1 tab(s) orally once a day  ibuprofen 400 mg oral tablet: 1 tab(s) orally every 6 hours As needed Moderate Pain (4 - 6)  Janumet 50 mg-1000 mg oral tablet: 1 tab(s) orally 2 times a day  losartan 25 mg oral tablet: 1 orally  metoprolol succinate 25 mg oral tablet, extended release: 1 tab(s) orally once a day  oxyCODONE 5 mg oral tablet: 1 tab(s) orally every 6 hours MDD: 4  Slow Release Iron (as elemental iron) 45 mg oral tablet, extended release: 1 tab(s) orally once a day  Trulance 3 mg oral tablet: 1 tab(s) orally once a day as needed for  constipation  Tylenol 500 mg oral tablet: 2 tab(s) orally every 6 hours as needed for  moderate pain

## 2023-06-30 ENCOUNTER — TRANSCRIPTION ENCOUNTER (OUTPATIENT)
Age: 73
End: 2023-06-30

## 2023-06-30 VITALS
HEART RATE: 71 BPM | TEMPERATURE: 98 F | RESPIRATION RATE: 18 BRPM | SYSTOLIC BLOOD PRESSURE: 117 MMHG | DIASTOLIC BLOOD PRESSURE: 64 MMHG | OXYGEN SATURATION: 99 %

## 2023-06-30 DIAGNOSIS — D62 ACUTE POSTHEMORRHAGIC ANEMIA: ICD-10-CM

## 2023-06-30 LAB
A1C WITH ESTIMATED AVERAGE GLUCOSE RESULT: 6.5 % — HIGH (ref 4–5.6)
ANION GAP SERPL CALC-SCNC: 8 MMOL/L — SIGNIFICANT CHANGE UP (ref 5–17)
BUN SERPL-MCNC: 32 MG/DL — HIGH (ref 7–18)
CALCIUM SERPL-MCNC: 8.1 MG/DL — LOW (ref 8.4–10.5)
CHLORIDE SERPL-SCNC: 95 MMOL/L — LOW (ref 96–108)
CO2 SERPL-SCNC: 29 MMOL/L — SIGNIFICANT CHANGE UP (ref 22–31)
CREAT SERPL-MCNC: 1.29 MG/DL — SIGNIFICANT CHANGE UP (ref 0.5–1.3)
EGFR: 59 ML/MIN/1.73M2 — LOW
ESTIMATED AVERAGE GLUCOSE: 140 MG/DL — HIGH (ref 68–114)
GLUCOSE BLDC GLUCOMTR-MCNC: 158 MG/DL — HIGH (ref 70–99)
GLUCOSE BLDC GLUCOMTR-MCNC: 164 MG/DL — HIGH (ref 70–99)
GLUCOSE SERPL-MCNC: 135 MG/DL — HIGH (ref 70–99)
HCT VFR BLD CALC: 23.2 % — LOW (ref 39–50)
HGB BLD-MCNC: 8.3 G/DL — LOW (ref 13–17)
MCHC RBC-ENTMCNC: 31.6 PG — SIGNIFICANT CHANGE UP (ref 27–34)
MCHC RBC-ENTMCNC: 35.8 GM/DL — SIGNIFICANT CHANGE UP (ref 32–36)
MCV RBC AUTO: 88.2 FL — SIGNIFICANT CHANGE UP (ref 80–100)
NRBC # BLD: 0 /100 WBCS — SIGNIFICANT CHANGE UP (ref 0–0)
PLATELET # BLD AUTO: 146 K/UL — LOW (ref 150–400)
POTASSIUM SERPL-MCNC: 3.4 MMOL/L — LOW (ref 3.5–5.3)
POTASSIUM SERPL-SCNC: 3.4 MMOL/L — LOW (ref 3.5–5.3)
RBC # BLD: 2.63 M/UL — LOW (ref 4.2–5.8)
RBC # FLD: 12.5 % — SIGNIFICANT CHANGE UP (ref 10.3–14.5)
SODIUM SERPL-SCNC: 132 MMOL/L — LOW (ref 135–145)
WBC # BLD: 15 K/UL — HIGH (ref 3.8–10.5)
WBC # FLD AUTO: 15 K/UL — HIGH (ref 3.8–10.5)

## 2023-06-30 PROCEDURE — 85027 COMPLETE CBC AUTOMATED: CPT

## 2023-06-30 PROCEDURE — 36415 COLL VENOUS BLD VENIPUNCTURE: CPT

## 2023-06-30 PROCEDURE — 84100 ASSAY OF PHOSPHORUS: CPT

## 2023-06-30 PROCEDURE — 82962 GLUCOSE BLOOD TEST: CPT

## 2023-06-30 PROCEDURE — C1781: CPT

## 2023-06-30 PROCEDURE — 80048 BASIC METABOLIC PNL TOTAL CA: CPT

## 2023-06-30 PROCEDURE — 83735 ASSAY OF MAGNESIUM: CPT

## 2023-06-30 PROCEDURE — 83036 HEMOGLOBIN GLYCOSYLATED A1C: CPT

## 2023-06-30 RX ORDER — POTASSIUM CHLORIDE 20 MEQ
20 PACKET (EA) ORAL ONCE
Refills: 0 | Status: COMPLETED | OUTPATIENT
Start: 2023-06-30 | End: 2023-06-30

## 2023-06-30 RX ORDER — FERROUS SULFATE 325(65) MG
1 TABLET ORAL
Qty: 14 | Refills: 0
Start: 2023-06-30 | End: 2023-07-13

## 2023-06-30 RX ORDER — POTASSIUM CHLORIDE 20 MEQ
40 PACKET (EA) ORAL ONCE
Refills: 0 | Status: COMPLETED | OUTPATIENT
Start: 2023-06-30 | End: 2023-06-30

## 2023-06-30 RX ADMIN — Medication 40 MILLIEQUIVALENT(S): at 10:59

## 2023-06-30 RX ADMIN — Medication 1: at 07:49

## 2023-06-30 RX ADMIN — Medication 20 MILLIEQUIVALENT(S): at 09:44

## 2023-06-30 RX ADMIN — Medication 20 MILLIGRAM(S): at 05:30

## 2023-06-30 RX ADMIN — Medication 1: at 11:55

## 2023-06-30 RX ADMIN — SODIUM CHLORIDE 3 MILLILITER(S): 9 INJECTION INTRAMUSCULAR; INTRAVENOUS; SUBCUTANEOUS at 06:26

## 2023-06-30 NOTE — PROGRESS NOTE ADULT - ASSESSMENT
73y.o. Male with post op anemia, resolved ROSARIO s/p RIH POD#2    -d/c planning
Assessment:  73 y.o. M S/p RIH repair on 6/28, POD#1 s/p incisional bleed with near-syncope episode in ASU.   VSS, afebrile.    Plan:   -Pt is post op stable, no signs of further bleeding from incisional site  -Discharge planning today  -Follow up with Dr. Ordaz in office

## 2023-06-30 NOTE — DISCHARGE NOTE NURSING/CASE MANAGEMENT/SOCIAL WORK - NSDCPEFALRISK_GEN_ALL_CORE
For information on Fall & Injury Prevention, visit: [email or web address] OR  [email or web address] OR  https://www.cdc.gov/steadi/patient.html

## 2023-06-30 NOTE — DISCHARGE NOTE NURSING/CASE MANAGEMENT/SOCIAL WORK - PATIENT PORTAL LINK FT
You can access the FollowMyHealth Patient Portal offered by [facility] by registering at the following website: [email or web address]. By joining [employer]’s FollowMyHealth portal, you will also be able to view your health information using other applications (apps) compatible with our system.

## 2023-06-30 NOTE — PROGRESS NOTE ADULT - PROBLEM SELECTOR PLAN 1
pain control  incentive spirometry  surgical follow up  Replace lytes  Diet as per surgery  DC planning

## 2023-06-30 NOTE — PROGRESS NOTE ADULT - PROBLEM SELECTOR PLAN 7
likely rel;ated to post operative bleed  ferrous sulfate po upon DC  No need for PRBCs at this time  no further bleed

## 2023-06-30 NOTE — PROGRESS NOTE ADULT - SUBJECTIVE AND OBJECTIVE BOX
INTERVAL HPI/OVERNIGHT EVENTS:    Pt seen and examined at bedside. Pt resting comfortably in bed, no acute complaints.  The patient states he has had no further dizziness or near-syncope episodes. Pt denies N/V/F/C.   He admits to some discomfort at the incisional site.     Vital Signs Last 24 Hrs  T(C): 36.9 (29 Jun 2023 05:50), Max: 37.1 (29 Jun 2023 00:18)  T(F): 98.5 (29 Jun 2023 05:50), Max: 98.8 (29 Jun 2023 00:18)  HR: 80 (29 Jun 2023 05:50) (67 - 100)  BP: 106/64 (29 Jun 2023 05:50) (105/71 - 177/93)  BP(mean): 74 (29 Jun 2023 05:50) (74 - 109)  RR: 18 (29 Jun 2023 05:50) (11 - 19)  SpO2: 100% (29 Jun 2023 05:50) (97% - 100%)    Parameters below as of 29 Jun 2023 05:50  Patient On (Oxygen Delivery Method): room air      I&O's Detail    28 Jun 2023 07:01  -  29 Jun 2023 07:00  --------------------------------------------------------  IN:    Lactated Ringers Bolus: 900 mL  Total IN: 900 mL    OUT:    Voided (mL): 400 mL  Total OUT: 400 mL    Total NET: 500 mL            Physical Exam  General: AAOx3, No acute distress  Skin: No jaundice, no icterus  Abdomen: soft,  nondistended, mild tenderness to palpation of incisional site, no rebound tenderness, no guarding, no palpable masses  : Normal external genitalia  Extremities: non edematous, no calf pain bilaterally    Drains/Tubes:     Labs:                        10.6   16.35 )-----------( 183      ( 29 Jun 2023 05:52 )             29.0     06-29    129<L>  |  91<L>  |  22<H>  ----------------------------<  196<H>  3.9   |  28  |  1.55<H>    Ca    8.7      29 Jun 2023 05:52  Phos  4.0     06-29  Mg     2.0     06-29          RADIOLOGY & ADDITIONAL STUDIES:    73yMale
INTERVAL HPI/OVERNIGHT EVENTS:  Pt resting comfortably. No acute complaints.   Tolerating reg diet.   Voiding well.  Ambulating with walker.    MEDICATIONS  (STANDING):  dextrose 5%. 1000 milliLiter(s) (50 mL/Hr) IV Continuous <Continuous>  dextrose 5%. 1000 milliLiter(s) (100 mL/Hr) IV Continuous <Continuous>  dextrose 50% Injectable 25 Gram(s) IV Push once  dextrose 50% Injectable 12.5 Gram(s) IV Push once  dextrose 50% Injectable 25 Gram(s) IV Push once  glucagon  Injectable 1 milliGRAM(s) IntraMuscular once  hydrocortisone 20 milliGRAM(s) Oral daily  insulin lispro (ADMELOG) corrective regimen sliding scale   SubCutaneous three times a day before meals  sodium chloride 0.9% lock flush 3 milliLiter(s) IV Push every 8 hours    MEDICATIONS  (PRN):  dextrose Oral Gel 15 Gram(s) Oral once PRN Blood Glucose LESS THAN 70 milliGRAM(s)/deciliter  oxycodone    5 mG/acetaminophen 325 mG 1 Tablet(s) Oral every 6 hours PRN Severe Pain (7 - 10)    Vital Signs Last 24 Hrs  T(C): 36.8 (30 Jun 2023 05:58), Max: 36.8 (29 Jun 2023 13:30)  T(F): 98.2 (30 Jun 2023 05:58), Max: 98.3 (29 Jun 2023 13:30)  HR: 71 (30 Jun 2023 05:58) (71 - 72)  BP: 117/64 (30 Jun 2023 05:58) (102/61 - 123/58)  BP(mean): 77 (30 Jun 2023 05:58) (77 - 77)  RR: 18 (30 Jun 2023 05:58) (17 - 18)  SpO2: 99% (30 Jun 2023 05:58) (99% - 100%)    Parameters below as of 30 Jun 2023 05:58  Patient On (Oxygen Delivery Method): room air    Physical:  General: A&Ox3. NAD.  Abdomen: Soft nondistended, nontender.  Pelvis: R groin dressing saturated with old blood. No active bleeding noted. Stable ecchymosis around incision  Soft scrotum. Testicles nontender.    LABS:                        8.3    15.00 )-----------( 146      ( 30 Jun 2023 06:13 )             23.2             06-30    132<L>  |  95<L>  |  32<H>  ----------------------------<  135<H>  3.4<L>   |  29  |  1.29    Ca    8.1<L>      30 Jun 2023 06:13  Phos  4.0     06-29  Mg     2.0     06-29    
  pt seen in icu [  ], reg med floor [ x  ], bed [ x ], chair at bedside [   ]  Awake, alert, comfortable in bed in NAD. Feeling better  REVIEW OF SYSTEMS:    CONSTITUTIONAL: No weakness, fevers or chills  EYES/ENT: No visual changes;  No vertigo or throat pain   NECK: No pain or stiffness  RESPIRATORY: No cough, wheezing, hemoptysis; No shortness of breath  CARDIOVASCULAR: No chest pain or palpitations  GASTROINTESTINAL: No abdominal or epigastric pain. No nausea, vomiting, or hematemesis; No diarrhea or constipation. No melena or hematochezia.  GENITOURINARY: No dysuria, frequency or hematuria  NEUROLOGICAL: No numbness or weakness  SKIN: No itching, burning, rashes, or lesions   All other review of systems is negative unless indicated above.    Physical Exam    General: WN/WD NAD  Neurology: A&Ox3, nonfocal, MCADAMS x 4  Respiratory: CTA B/L  CV: RRR, S1S2, no murmurs, rubs or gallops  Abdominal: Soft, NT, ND +BS, Last BM  Extremities: No edema, + peripheral pulses      Allergies  penicillin (Rash)      Health Issues  Unilateral inguinal hernia without obstruction or gangrene    Umbilical hernia without obstruction and without gangrene    Encounter for other preprocedural examination    Umbilical hernia without obstruction or gangrene    Non-recurrent unilateral inguinal hernia without obstruction or gangrene    HTN (hypertension)    DM (diabetes mellitus)    Sciatica of left side    Chronic radicular pain of lower back    CN (constipation)    Seasonal allergies    Lumbar herniated disc    Diabetic neuropathy    Adrenal hypofunction    H/O hemorrhoidectomy    History of circumcision    H/O bilateral cataract extraction    H/O: hemorrhoidectomy    History of varicose vein stripping    History of testicular mass excision        Vitals  T(F): 98.2 (06-30-23 @ 05:58), Max: 98.3 (06-29-23 @ 13:30)  HR: 71 (06-30-23 @ 05:58) (71 - 72)  BP: 117/64 (06-30-23 @ 05:58) (102/61 - 123/58)  RR: 18 (06-30-23 @ 05:58) (17 - 18)  SpO2: 99% (06-30-23 @ 05:58) (99% - 100%)  Wt(kg): --  CAPILLARY BLOOD GLUCOSE      POCT Blood Glucose.: 158 mg/dL (30 Jun 2023 07:33)      Labs                          8.3    15.00 )-----------( 146      ( 30 Jun 2023 06:13 )             23.2       06-30    132<L>  |  95<L>  |  32<H>  ----------------------------<  135<H>  3.4<L>   |  29  |  1.29    Ca    8.1<L>      30 Jun 2023 06:13  Phos  4.0     06-29  Mg     2.0     06-29              Radiology Results      Meds    MEDICATIONS  (STANDING):  dextrose 5%. 1000 milliLiter(s) (50 mL/Hr) IV Continuous <Continuous>  dextrose 5%. 1000 milliLiter(s) (100 mL/Hr) IV Continuous <Continuous>  dextrose 50% Injectable 25 Gram(s) IV Push once  dextrose 50% Injectable 12.5 Gram(s) IV Push once  dextrose 50% Injectable 25 Gram(s) IV Push once  glucagon  Injectable 1 milliGRAM(s) IntraMuscular once  hydrocortisone 20 milliGRAM(s) Oral daily  insulin lispro (ADMELOG) corrective regimen sliding scale   SubCutaneous three times a day before meals  sodium chloride 0.9% lock flush 3 milliLiter(s) IV Push every 8 hours      MEDICATIONS  (PRN):  dextrose Oral Gel 15 Gram(s) Oral once PRN Blood Glucose LESS THAN 70 milliGRAM(s)/deciliter  oxycodone    5 mG/acetaminophen 325 mG 1 Tablet(s) Oral every 6 hours PRN Severe Pain (7 - 10)

## 2023-07-05 ENCOUNTER — NON-APPOINTMENT (OUTPATIENT)
Age: 73
End: 2023-07-05

## 2023-07-06 RX ORDER — IBUPROFEN 600 MG/1
600 TABLET, FILM COATED ORAL EVERY 6 HOURS
Qty: 56 | Refills: 0 | Status: ACTIVE | COMMUNITY
Start: 2023-07-06 | End: 1900-01-01

## 2023-07-14 PROBLEM — J30.2 OTHER SEASONAL ALLERGIC RHINITIS: Chronic | Status: ACTIVE | Noted: 2023-06-23

## 2023-07-14 PROBLEM — K59.00 CONSTIPATION, UNSPECIFIED: Chronic | Status: ACTIVE | Noted: 2023-06-23

## 2023-07-14 PROBLEM — E11.9 TYPE 2 DIABETES MELLITUS WITHOUT COMPLICATIONS: Chronic | Status: ACTIVE | Noted: 2023-06-23

## 2023-07-14 PROBLEM — M51.26 OTHER INTERVERTEBRAL DISC DISPLACEMENT, LUMBAR REGION: Chronic | Status: ACTIVE | Noted: 2023-06-23

## 2023-07-14 PROBLEM — E11.40 TYPE 2 DIABETES MELLITUS WITH DIABETIC NEUROPATHY, UNSPECIFIED: Chronic | Status: ACTIVE | Noted: 2023-06-23

## 2023-07-14 PROBLEM — M54.32 SCIATICA, LEFT SIDE: Chronic | Status: ACTIVE | Noted: 2023-06-23

## 2023-07-14 PROBLEM — K42.9 UMBILICAL HERNIA WITHOUT OBSTRUCTION OR GANGRENE: Chronic | Status: ACTIVE | Noted: 2023-06-23

## 2023-07-14 PROBLEM — E27.40 UNSPECIFIED ADRENOCORTICAL INSUFFICIENCY: Chronic | Status: ACTIVE | Noted: 2023-06-23

## 2023-07-14 PROBLEM — K40.90 UNILATERAL INGUINAL HERNIA, WITHOUT OBSTRUCTION OR GANGRENE, NOT SPECIFIED AS RECURRENT: Chronic | Status: ACTIVE | Noted: 2023-06-23

## 2023-07-14 PROBLEM — M54.16 RADICULOPATHY, LUMBAR REGION: Chronic | Status: ACTIVE | Noted: 2023-06-23

## 2023-07-14 PROBLEM — I10 ESSENTIAL (PRIMARY) HYPERTENSION: Chronic | Status: ACTIVE | Noted: 2023-06-23

## 2023-07-17 ENCOUNTER — APPOINTMENT (OUTPATIENT)
Dept: SURGERY | Facility: CLINIC | Age: 73
End: 2023-07-17
Payer: MEDICARE

## 2023-07-17 PROCEDURE — 99024 POSTOP FOLLOW-UP VISIT: CPT

## 2023-07-17 NOTE — HISTORY OF PRESENT ILLNESS
[de-identified] : Mr. LIMA  is s/p Right inguinal hernia repair with Marlex mesh  and umbilical hernia repair on 06/28/2023. Today Mr. LIMA offers no complaints. patient reports no fever, chills,  or  pain. His  surgical wounds are  healing well. No signs of inflammation, infection or exudate. Patient reports good bowel movements and appetite.

## 2023-07-17 NOTE — ASSESSMENT
[FreeTextEntry1] : Mr. LIMA is doing well, with excellent post-operative recovery. All surgical incisions are healing well and as expected. There is no evidence of infection or complication, and he is progressing as expected. Post-operative wound care, activity, restrictions and precautions reinforced. Patient instructed to refrain from any heavy lifting greater than 10-15 pounds for at least 4-6 weeks post-operatively. Patient's questions and concerns addressed to patient's satisfaction.\par

## 2023-07-17 NOTE — PHYSICAL EXAM
[Alert] : alert [Oriented to Person] : oriented to person [Oriented to Place] : oriented to place [Oriented to Time] : oriented to time [Calm] : calm [de-identified] : He  is alert, well-groomed, and in NAD\par   [de-identified] : anicteric.  Nasal mucosa pink, septum midline. Oral mucosa pink.  Tongue midline, Pharynx without exudates.\par   [de-identified] : right groin and umbilicus Surgical wounds are  healing well.   no signs of  inflammation or infection. no hernia recurrence   No penile discharge or lesions.  No scrotal swelling or discoloration. Testes descended bilaterally, smooth, without masses. Epididymis non-tender.

## 2023-07-19 ENCOUNTER — EMERGENCY (EMERGENCY)
Facility: HOSPITAL | Age: 73
LOS: 1 days | Discharge: HOME | End: 2023-07-19
Attending: EMERGENCY MEDICINE
Payer: COMMERCIAL

## 2023-07-19 VITALS
HEIGHT: 65 IN | OXYGEN SATURATION: 99 % | HEART RATE: 74 BPM | DIASTOLIC BLOOD PRESSURE: 82 MMHG | TEMPERATURE: 98 F | RESPIRATION RATE: 18 BRPM | SYSTOLIC BLOOD PRESSURE: 178 MMHG | WEIGHT: 164.91 LBS

## 2023-07-19 VITALS
OXYGEN SATURATION: 99 % | HEART RATE: 60 BPM | SYSTOLIC BLOOD PRESSURE: 169 MMHG | RESPIRATION RATE: 16 BRPM | DIASTOLIC BLOOD PRESSURE: 79 MMHG | TEMPERATURE: 99 F

## 2023-07-19 DIAGNOSIS — Z98.41 CATARACT EXTRACTION STATUS, RIGHT EYE: Chronic | ICD-10-CM

## 2023-07-19 DIAGNOSIS — Z98.890 OTHER SPECIFIED POSTPROCEDURAL STATES: Chronic | ICD-10-CM

## 2023-07-19 LAB
ALBUMIN SERPL ELPH-MCNC: 3.6 G/DL — SIGNIFICANT CHANGE UP (ref 3.5–5)
ALP SERPL-CCNC: 73 U/L — SIGNIFICANT CHANGE UP (ref 40–120)
ALT FLD-CCNC: 19 U/L DA — SIGNIFICANT CHANGE UP (ref 10–60)
ANION GAP SERPL CALC-SCNC: 5 MMOL/L — SIGNIFICANT CHANGE UP (ref 5–17)
APTT BLD: 32 SEC — SIGNIFICANT CHANGE UP (ref 27.5–35.5)
AST SERPL-CCNC: 21 U/L — SIGNIFICANT CHANGE UP (ref 10–40)
BASOPHILS # BLD AUTO: 0.03 K/UL — SIGNIFICANT CHANGE UP (ref 0–0.2)
BASOPHILS NFR BLD AUTO: 0.3 % — SIGNIFICANT CHANGE UP (ref 0–2)
BILIRUB SERPL-MCNC: 0.7 MG/DL — SIGNIFICANT CHANGE UP (ref 0.2–1.2)
BLD GP AB SCN SERPL QL: SIGNIFICANT CHANGE UP
BUN SERPL-MCNC: 13 MG/DL — SIGNIFICANT CHANGE UP (ref 7–18)
CALCIUM SERPL-MCNC: 9.1 MG/DL — SIGNIFICANT CHANGE UP (ref 8.4–10.5)
CHLORIDE SERPL-SCNC: 97 MMOL/L — SIGNIFICANT CHANGE UP (ref 96–108)
CO2 SERPL-SCNC: 28 MMOL/L — SIGNIFICANT CHANGE UP (ref 22–31)
CREAT SERPL-MCNC: 0.87 MG/DL — SIGNIFICANT CHANGE UP (ref 0.5–1.3)
EGFR: 92 ML/MIN/1.73M2 — SIGNIFICANT CHANGE UP
EOSINOPHIL # BLD AUTO: 0.1 K/UL — SIGNIFICANT CHANGE UP (ref 0–0.5)
EOSINOPHIL NFR BLD AUTO: 1.1 % — SIGNIFICANT CHANGE UP (ref 0–6)
GLUCOSE SERPL-MCNC: 149 MG/DL — HIGH (ref 70–99)
HCT VFR BLD CALC: 32.6 % — LOW (ref 39–50)
HGB BLD-MCNC: 11.5 G/DL — LOW (ref 13–17)
IMM GRANULOCYTES NFR BLD AUTO: 0.2 % — SIGNIFICANT CHANGE UP (ref 0–0.9)
LYMPHOCYTES # BLD AUTO: 1.35 K/UL — SIGNIFICANT CHANGE UP (ref 1–3.3)
LYMPHOCYTES # BLD AUTO: 15.3 % — SIGNIFICANT CHANGE UP (ref 13–44)
MCHC RBC-ENTMCNC: 31.9 PG — SIGNIFICANT CHANGE UP (ref 27–34)
MCHC RBC-ENTMCNC: 35.3 GM/DL — SIGNIFICANT CHANGE UP (ref 32–36)
MCV RBC AUTO: 90.6 FL — SIGNIFICANT CHANGE UP (ref 80–100)
MONOCYTES # BLD AUTO: 0.73 K/UL — SIGNIFICANT CHANGE UP (ref 0–0.9)
MONOCYTES NFR BLD AUTO: 8.2 % — SIGNIFICANT CHANGE UP (ref 2–14)
NEUTROPHILS # BLD AUTO: 6.62 K/UL — SIGNIFICANT CHANGE UP (ref 1.8–7.4)
NEUTROPHILS NFR BLD AUTO: 74.9 % — SIGNIFICANT CHANGE UP (ref 43–77)
NRBC # BLD: 0 /100 WBCS — SIGNIFICANT CHANGE UP (ref 0–0)
PLATELET # BLD AUTO: 188 K/UL — SIGNIFICANT CHANGE UP (ref 150–400)
POTASSIUM SERPL-MCNC: 4.2 MMOL/L — SIGNIFICANT CHANGE UP (ref 3.5–5.3)
POTASSIUM SERPL-SCNC: 4.2 MMOL/L — SIGNIFICANT CHANGE UP (ref 3.5–5.3)
PROT SERPL-MCNC: 7.7 G/DL — SIGNIFICANT CHANGE UP (ref 6–8.3)
RBC # BLD: 3.6 M/UL — LOW (ref 4.2–5.8)
RBC # FLD: 14.5 % — SIGNIFICANT CHANGE UP (ref 10.3–14.5)
SODIUM SERPL-SCNC: 130 MMOL/L — LOW (ref 135–145)
WBC # BLD: 8.85 K/UL — SIGNIFICANT CHANGE UP (ref 3.8–10.5)
WBC # FLD AUTO: 8.85 K/UL — SIGNIFICANT CHANGE UP (ref 3.8–10.5)

## 2023-07-19 PROCEDURE — 99285 EMERGENCY DEPT VISIT HI MDM: CPT

## 2023-07-19 PROCEDURE — 86900 BLOOD TYPING SEROLOGIC ABO: CPT

## 2023-07-19 PROCEDURE — 36415 COLL VENOUS BLD VENIPUNCTURE: CPT

## 2023-07-19 PROCEDURE — 74177 CT ABD & PELVIS W/CONTRAST: CPT | Mod: 26,MA

## 2023-07-19 PROCEDURE — 74177 CT ABD & PELVIS W/CONTRAST: CPT | Mod: MA

## 2023-07-19 PROCEDURE — 80053 COMPREHEN METABOLIC PANEL: CPT

## 2023-07-19 PROCEDURE — 85730 THROMBOPLASTIN TIME PARTIAL: CPT

## 2023-07-19 PROCEDURE — 85025 COMPLETE CBC W/AUTO DIFF WBC: CPT

## 2023-07-19 PROCEDURE — 99284 EMERGENCY DEPT VISIT MOD MDM: CPT | Mod: 25

## 2023-07-19 PROCEDURE — 86901 BLOOD TYPING SEROLOGIC RH(D): CPT

## 2023-07-19 PROCEDURE — 86850 RBC ANTIBODY SCREEN: CPT

## 2023-07-19 NOTE — ED ADULT NURSE NOTE - NSFALLHARMRISKINTERV_ED_ALL_ED

## 2023-07-19 NOTE — ED PROVIDER NOTE - OBJECTIVE STATEMENT
73-year-old male presents status post bleeding from surgical incision.  Patient had a hernia repair by Dr. Osorio 3 weeks ago.  He saw Dr. oRmano in the office 2 days ago and had no issues.  Today at approximately 2:00, 2 hours ago patient started bleeding.  EMS arrived and placed abdominal pad and bleeding stopped.  Patient denies dizziness lightheadedness nausea vomiting trauma he is not on blood thinners he did not stand up quickly when this started or anything of the sort.

## 2023-07-19 NOTE — ED PROVIDER NOTE - CLINICAL SUMMARY MEDICAL DECISION MAKING FREE TEXT BOX
Status post hernia repair with acute onsets bleeding from incision which has now stopped.  Patient is in no distress, no signs of shock.  We will do labs CT and consult surgery for

## 2023-07-19 NOTE — CONSULT NOTE ADULT - SUBJECTIVE AND OBJECTIVE BOX
HPI: 73 year old male s/p Right Inguinal Hernia repair with mesh and umbilical hernia repair 6/28/23 and was admitted postoperatively for bleeding from the surgical site and near syncopal episode. He was able to be discharged POD#2 when he was stable and dressing remained dry. Patient presents to the ED today due to bleeding that occurred from the surgical site earlier today which resolved with pressure and ABD. Patient admits to intermittent dizziness but denies dizziness today. Denies chest pain, shortness of breath, problems ambulating. Denies taking aspirin and blood thinners.     PAST MEDICAL & SURGICAL HISTORY:  Umbilical hernia without obstruction or gangrene  Non-recurrent unilateral inguinal hernia without obstruction or gangrene  HTN (hypertension)  DM (diabetes mellitus)  Sciatica of left side  Chronic radicular pain of lower back  CN (constipation)  Seasonal allergies  Lumbar herniated disc  Diabetic neuropathy  Adrenal hypofunction  H/O hemorrhoidectomy  History of circumcision  H/O bilateral cataract extraction  H/O: hemorrhoidectomy  History of varicose vein stripping  History of testicular mass excision    Review of Systems: Contained within HPI     Allergies: penicillin (Rash)    FAMILY HISTORY:  History of hypertension in sibling (Sibling)  Family history of lung disease (Father)  Family history of cardiomegaly (Mother)    Vital Signs Last 24 Hrs  T(C): 36.8 (19 Jul 2023 15:14), Max: 36.8 (19 Jul 2023 15:14)  T(F): 98.3 (19 Jul 2023 15:14), Max: 98.3 (19 Jul 2023 15:14)  HR: 74 (19 Jul 2023 15:14) (74 - 74)  BP: 178/82 (19 Jul 2023 15:14) (178/82 - 178/82)  RR: 18 (19 Jul 2023 15:14) (18 - 18)  SpO2: 99% (19 Jul 2023 15:14) (99% - 99%)  Parameters below as of 19 Jul 2023 15:14  Patient On (Oxygen Delivery Method): room air    Physical Exam:  General:  Appears stated age, well-groomed, well-nourished, no distress  Eyes: EOMI  HENT:  WNL, no JVD  Chest: respirations nonlabored  Cardiovascular:  Regular rate & rhythm  Abdomen: umbilical hernia steri-strips in place and dry. Right lower quadrant dressing with dark staining to lateral steri-strips and ABD with tape across dressing. Dressing taken down, lateral steri-strips off. Able to express dark, old blood with small clots from surgical site, likely from prior bleeding. Ecchymosis noted to right upper thigh and groin. Dry sterile dressing applied to surgical site.     Extremities: no edema bilaterally  Skin: warm and dry  Musculoskeletal: no calf tenderness  Neuro:  Alert, oriented tp time, place and person   Psych: normal affect    LABS:                        11.5   8.85  )-----------( 188      ( 19 Jul 2023 16:20 )             32.6     07-19    130<L>  |  97  |  13  ----------------------------<  149<H>  4.2   |  28  |  0.87    Ca    9.1      19 Jul 2023 16:20    TPro  7.7  /  Alb  3.6  /  TBili  0.7  /  DBili  x   /  AST  21  /  ALT  19  /  AlkPhos  73  07-19    PTT - ( 19 Jul 2023 16:20 )  PTT:32.0 sec  Urinalysis Basic - ( 19 Jul 2023 16:20 )    Color: x / Appearance: x / SG: x / pH: x  Gluc: 149 mg/dL / Ketone: x  / Bili: x / Urobili: x   Blood: x / Protein: x / Nitrite: x   Leuk Esterase: x / RBC: x / WBC x   Sq Epi: x / Non Sq Epi: x / Bacteria: x

## 2023-07-19 NOTE — ED PROVIDER NOTE - NSFOLLOWUPINSTRUCTIONS_ED_ALL_ED_FT
Dr Bishop at the bedside agrees this is not a concerning amount of blood and there is no reason to CT.  Will discharge you and see him again as he recommend.

## 2023-07-19 NOTE — ED ADULT NURSE NOTE - CHIEF COMPLAINT QUOTE
s/p hernia surgery x 3 weeks, R groin, + bleeding with dressing, no foul smelling, s/sx of infection

## 2023-07-19 NOTE — ED PROVIDER NOTE - PATIENT PORTAL LINK FT
You can access the FollowMyHealth Patient Portal offered by [facility] by registering at the following website: [email or web address]. By joining [employer]’s FollowMyHealth portal, you will also be able to view your health information using other applications (apps) compatible with our system. You can access the FollowMyHealth Patient Portal offered by [facility] by registering at the following website: [email or web address]. By joining [employer]’s FollowMyHealth portal, you will also be able to view your health information using other applications (apps) compatible with our system.

## 2023-07-19 NOTE — ED ADULT TRIAGE NOTE - CHIEF COMPLAINT QUOTE
s/p hernia surgery x 3 weeks, + bleeding with dressing, no foul smelling, s/sx of infection s/p hernia surgery x 3 weeks, R groin, + bleeding with dressing, no foul smelling, s/sx of infection

## 2023-07-19 NOTE — ED PROVIDER NOTE - PHYSICAL EXAMINATION
General: Well appearing, mild acute distress, appears stated age  HEENT: normocephalic, atraumatic   Respiratory: normal work of breathing  MSK: no swelling or tenderness of lower extremities, moving all extremities spontaneously   Skin: warm, dry  Neuro: A&Ox3, cranial nerves II-XII intact, 5/5 strength in all extremities, no sensory deficits, normal gait   Psych: appropriate affect  ABD: soft, mild tenderness and swelling at surgical site with staples in place, not actively bleeding, , nondistended, no guarding, no rebound, no CVA tenderness

## 2023-07-19 NOTE — CONSULT NOTE ADULT - ASSESSMENT
73 year old male s/p Right Inguinal Hernia repair with mesh and umbilical hernia repair 6/28/23 with bleeding from Right Inguinal Hernia site.  Labs stable    - continue local wound care with DSD  - No CT scan needed at this time  - call for further bleeding/if you see bright red blood with clots  - recommend no Ibuprofen at this time  - outpatient follow up with Dr. Ordaz   - discussed with Dr. Ordaz

## 2023-07-19 NOTE — ED ADULT NURSE NOTE - IS THE PATIENT ABLE TO BE SCREENED?
RN NOTE

RECEIVED PT FROM ER ACCOMPANIED BY RN AND EMT UNDER ACLS PROTOCOL. PT IS ALERT AND ORIENTED 
X 2 WITH PHYSICAL AND VERBAL AGGRESSION TOWARDS STAFF. ON ROOM AIR, RESPIRATIONS UNLABORED, 
DENIES PAIN OR DISCOMFORT, SR ON THE TELE MONITOR, VITAL SIGNS STABLE, COMPREHENSIVE 
PHYSICAL ASSESSMENT AND BELONGING CHECK DONE, WITH RIGHT HAND 20G IV DONE. SAFETY MEASURES 
IN PLACE PER PROTOCOL, BED ALARM ON, BED LOCKED AND IN LOW POSITION, SIDE RAILS UP X 2, WILL 
MONITOR PATIENT AND CARRY OUT ACTIVE MD ORDERS. Yes

## 2023-07-24 ENCOUNTER — APPOINTMENT (OUTPATIENT)
Dept: SURGERY | Facility: CLINIC | Age: 73
End: 2023-07-24
Payer: MEDICARE

## 2023-07-24 VITALS — SYSTOLIC BLOOD PRESSURE: 175 MMHG | HEIGHT: 63 IN | DIASTOLIC BLOOD PRESSURE: 91 MMHG | HEART RATE: 71 BPM

## 2023-07-24 PROCEDURE — 99024 POSTOP FOLLOW-UP VISIT: CPT

## 2023-07-24 NOTE — HISTORY OF PRESENT ILLNESS
[de-identified] : Mr. LIMA  is s/p Right inguinal hernia repair with Marlex mesh  and umbilical hernia repair on 06/28/2023.  He is presenting for a wound check. patient was in ED on Thursday because he had bleeding from the incision in the right groin.    Today Mr. LIMA offers no complaints. patient reports no fever, chills,  or  pain. His  surgical wounds are  healing well. No signs of inflammation, infection or exudate.  Patient reports good bowel movements and appetite.  he denies bleeding since he left ED. \par

## 2023-07-24 NOTE — PHYSICAL EXAM
[Alert] : alert [Oriented to Person] : oriented to person [Oriented to Place] : oriented to place [Oriented to Time] : oriented to time [Calm] : calm [de-identified] : He  is alert, well-groomed, and in NAD\par   [de-identified] : anicteric.  Nasal mucosa pink, septum midline. Oral mucosa pink.  Tongue midline, Pharynx without exudates.\par   [de-identified] : right groin and umbilicus Surgical wounds are  healing well.   no signs of  inflammation or infection. no hernia recurrence   No penile discharge or lesions.  No scrotal swelling or discoloration. Testes descended bilaterally, smooth, without masses. Epididymis non-tender.

## 2023-07-26 ENCOUNTER — APPOINTMENT (OUTPATIENT)
Dept: SURGERY | Facility: CLINIC | Age: 73
End: 2023-07-26
Payer: MEDICARE

## 2023-07-26 VITALS
BODY MASS INDEX: 26.22 KG/M2 | SYSTOLIC BLOOD PRESSURE: 166 MMHG | HEART RATE: 65 BPM | OXYGEN SATURATION: 100 % | WEIGHT: 148 LBS | TEMPERATURE: 99.3 F | DIASTOLIC BLOOD PRESSURE: 80 MMHG | HEIGHT: 63 IN

## 2023-07-26 PROCEDURE — 99024 POSTOP FOLLOW-UP VISIT: CPT

## 2023-07-26 NOTE — PLAN
[FreeTextEntry1] : patient will follow up  on Monday  or if needed. Warning signs, follow up, and restrictions were discussed with the patient.

## 2023-07-26 NOTE — PHYSICAL EXAM
[Alert] : alert [Oriented to Person] : oriented to person [Oriented to Place] : oriented to place [Oriented to Time] : oriented to time [Calm] : calm [de-identified] : right groin   Surgical wounds  is healing well. there is a pinpoint opening in the lateral portion of the wound with serous drainage oozing out. no signs of inflammation or infection. no hernia recurrence No penile discharge or lesions. No scrotal swelling or discoloration. Testes descended bilaterally, smooth, without masses. Epididymis non-tender.

## 2023-07-26 NOTE — HISTORY OF PRESENT ILLNESS
[de-identified] :  a walk in \par Mr. LIMA is s/p Right inguinal hernia repair with Marlex mesh and umbilical hernia repair on 06/28/2023. He is presenting for a wound check because he saw blood on his wound dressing that started today.   Otherwise he  offers no complaints. he reports no fever, chills, or pain.  Patient reports good bowel movements and appetite. 
Clothing

## 2023-07-26 NOTE — ASSESSMENT
[FreeTextEntry1] : Mr. LIMA is doing well, with good post-operative recovery.   his surgical wounds  is healing well. there is a pinpoint opening in the lateral portion of the wound with serous drainage oozing out. no signs of inflammation or infection There is no evidence of infection or complication, and he is progressing as expected. Post-operative wound care, activity, restrictions and precautions reinforced. Patient instructed to refrain from any heavy lifting greater than 10-15 pounds for at least 4-6 weeks post-operatively. Patient's questions and concerns addressed to patient's satisfaction.\par

## 2023-07-31 ENCOUNTER — APPOINTMENT (OUTPATIENT)
Dept: SURGERY | Facility: CLINIC | Age: 73
End: 2023-07-31
Payer: MEDICARE

## 2023-07-31 PROCEDURE — 99024 POSTOP FOLLOW-UP VISIT: CPT

## 2023-07-31 NOTE — PHYSICAL EXAM
[Alert] : alert [Oriented to Person] : oriented to person [Oriented to Place] : oriented to place [Oriented to Time] : oriented to time [Calm] : calm [de-identified] : He  is alert, well-groomed, and in NAD\par    [de-identified] : anicteric.  Nasal mucosa pink, septum midline. Oral mucosa pink.  Tongue midline, Pharynx without exudates.\par    [de-identified] : Surgical wound is healing well.   no signs of  inflammation or infection.  no hernia recurrence   No penile discharge or lesions.  No scrotal swelling or discoloration. Testes descended bilaterally, smooth, without masses. Epididymis non-tender.

## 2023-07-31 NOTE — HISTORY OF PRESENT ILLNESS
[de-identified] : Mr. LIMA  is s/p Right inguinal hernia repair with Marlex mesh  and umbilical hernia repair on 06/28/2023.     Today Mr. LIMA offers no complaints. patient reports no fever, chills,  or  pain. His  surgical wounds are  healing well. No signs of inflammation, infection or exudate.  Patient reports good bowel movements and appetite.  he denies any further bleeding

## 2023-07-31 NOTE — ASSESSMENT
[FreeTextEntry1] : Mr. LIMA is doing well, with excellent post-operative recovery. All surgical incisions are healing well and as expected. There is no evidence of infection or complication, and he is progressing as expected. Post-operative wound care, activity, restrictions and precautions reinforced. Patient instructed to refrain from any heavy lifting greater than 10-15 pounds for at least 4-6 weeks post-operatively. Patient's questions and concerns addressed to patient's satisfaction.

## 2023-10-11 PROBLEM — K40.20 BILATERAL INGUINAL HERNIA WITHOUT OBSTRUCTION OR GANGRENE: Status: ACTIVE | Noted: 2023-06-08

## 2023-10-11 PROBLEM — K42.9 UMBILICAL HERNIA WITHOUT OBSTRUCTION OR GANGRENE: Status: ACTIVE | Noted: 2023-06-08

## 2023-10-16 ENCOUNTER — APPOINTMENT (OUTPATIENT)
Dept: SURGERY | Facility: CLINIC | Age: 73
End: 2023-10-16
Payer: MEDICARE

## 2023-10-16 VITALS
BODY MASS INDEX: 18.77 KG/M2 | SYSTOLIC BLOOD PRESSURE: 186 MMHG | DIASTOLIC BLOOD PRESSURE: 99 MMHG | HEIGHT: 75 IN | OXYGEN SATURATION: 100 % | WEIGHT: 151 LBS | HEART RATE: 70 BPM

## 2023-10-16 DIAGNOSIS — K40.20 BILATERAL INGUINAL HERNIA, W/OUT OBSTRUCTION OR GANGRENE, NOT SPECIFIED AS RECURRENT: ICD-10-CM

## 2023-10-16 DIAGNOSIS — K42.9 UMBILICAL HERNIA W/OUT OBSTRUCTION OR GANGRENE: ICD-10-CM

## 2023-10-16 PROCEDURE — 99213 OFFICE O/P EST LOW 20 MIN: CPT

## 2023-10-30 ENCOUNTER — APPOINTMENT (OUTPATIENT)
Age: 73
End: 2023-10-30

## 2024-04-25 ENCOUNTER — APPOINTMENT (OUTPATIENT)
Dept: SURGERY | Facility: CLINIC | Age: 74
End: 2024-04-25
Payer: MEDICARE

## 2024-04-25 VITALS
BODY MASS INDEX: 23.22 KG/M2 | WEIGHT: 136 LBS | DIASTOLIC BLOOD PRESSURE: 92 MMHG | OXYGEN SATURATION: 99 % | SYSTOLIC BLOOD PRESSURE: 184 MMHG | HEART RATE: 67 BPM | HEIGHT: 64.17 IN

## 2024-04-25 PROCEDURE — 99203 OFFICE O/P NEW LOW 30 MIN: CPT

## 2024-12-22 NOTE — ED ADULT TRIAGE NOTE - CCCP TRG CHIEF CMPLNT
Pharmacy Vancomycin Initial Note  Date of Service 2024  Patient's  1933  89 year old, female    Indication: [facility] Pneumonia    Current estimated CrCl = Estimated Creatinine Clearance: 31.6 mL/min (A) (based on SCr of 1 mg/dL (H)).    Creatinine for last 3 days  2024:  1:03 PM Creatinine 1.00 mg/dL    Recent Vancomycin Level(s) for last 3 days  No results found for requested labs within last 3 days.      Vancomycin IV Administrations (past 72 hours)        No vancomycin orders with administrations in past 72 hours.                    Nephrotoxins and other renal medications (From now, onward)      None            Contrast Orders - past 72 hours (72h ago, onward)      Start     Dose/Rate Route Frequency Stop    24 1500  iopamidol (ISOVUE-370) solution 75 mL         75 mL Intravenous ONCE 24 0626              Plan:  Give vancomycin 1000 mg IV once. If vancomycin is to continue inpatient, please re-consult pharmacy.     Silvina Camara, PharmD   wound check

## 2025-04-21 ENCOUNTER — APPOINTMENT (OUTPATIENT)
Dept: SURGERY | Facility: CLINIC | Age: 75
End: 2025-04-21
Payer: MEDICARE

## 2025-04-21 VITALS
SYSTOLIC BLOOD PRESSURE: 186 MMHG | DIASTOLIC BLOOD PRESSURE: 89 MMHG | HEIGHT: 64.17 IN | BODY MASS INDEX: 23.9 KG/M2 | OXYGEN SATURATION: 100 % | HEART RATE: 72 BPM | WEIGHT: 140 LBS

## 2025-04-21 DIAGNOSIS — K40.90 UNILATERAL INGUINAL HERNIA, W/OUT OBSTRUCTION OR GANGRENE, NOT SPECIFIED AS RECURRENT: ICD-10-CM

## 2025-04-21 PROCEDURE — 99213 OFFICE O/P EST LOW 20 MIN: CPT

## 2025-04-21 NOTE — PHYSICAL EXAM
[Alert] : alert [Oriented to Person] : oriented to person [Oriented to Place] : oriented to place [Oriented to Time] : oriented to time [Calm] : calm [de-identified] : He  is alert, well-groomed, and in NAD\par    [de-identified] : anicteric.  Nasal mucosa pink, septum midline. Oral mucosa pink.  Tongue midline, Pharynx without exudates.\par    [de-identified] :   reducible Left   inguinal hernia.  No evidence of hernia on the opposite side.  No penile discharge or lesions.  No scrotal swelling or discoloration. Testes descended bilaterally, smooth, without masses. Epididymis non-tender.

## 2025-04-21 NOTE — PLAN
[FreeTextEntry1] : Mr. PHOENIX  was told significance of findings, options, risks and benefits were explained.  Informed consent for  left inguinal hernia repair with mesh , and potential risks, benefits and alternatives (surgical options were discussed including non-surgical options or the option of no surgery) to the planned surgery were discussed in depth.  All surgical options were discussed including non-surgical treatments.  He wishes to proceed with surgery.  We will plan for surgery on at the next available date, pending any required insurance pre-certification or pre-approval. He agrees to obtain any necessary pre-operative evaluations and testing prior to surgery. Patient advised to seek immediate medical attention with any acute change in symptoms or with the development of any new or worsening symptoms.  Patient's questions and concerns addressed to patient's satisfaction, and patient verbalized an understanding of the information discussed.

## 2025-04-21 NOTE — PHYSICAL EXAM
[Alert] : alert [Oriented to Person] : oriented to person [Oriented to Place] : oriented to place [Oriented to Time] : oriented to time [Calm] : calm [de-identified] : He  is alert, well-groomed, and in NAD\par    [de-identified] :   reducible Left   inguinal hernia.  No evidence of hernia on the opposite side.  No penile discharge or lesions.  No scrotal swelling or discoloration. Testes descended bilaterally, smooth, without masses. Epididymis non-tender.  [de-identified] : anicteric.  Nasal mucosa pink, septum midline. Oral mucosa pink.  Tongue midline, Pharynx without exudates.\par

## 2025-04-21 NOTE — HISTORY OF PRESENT ILLNESS
[de-identified] :  This is  a 75 year   old patient presenting today for a follow up visit.   Mr. CAIN PHOENIX  was seen in April 2024  with the chief complaint of having  swelling in his  Left   groin.   He was found to have  reducible Left   inguinal hernia. he did not want to have the surgery and wanted to observe the condition.   He is returning today with a with the chief complaint of having pain   in his  Left   groin  and is worse when he   is walking or standing.  Mr. PHOENIX is stating that the swelling  is getting bigger and symptomatic. He denies any fever or  night sweats.  Appetite is good and weight is stable. Mr. PHOENIX is s/p Right inguinal hernia repair with Marlex mesh and umbilical hernia repair on 06/28/2023.

## 2025-04-21 NOTE — HISTORY OF PRESENT ILLNESS
[de-identified] :  This is  a 75 year   old patient presenting today for a follow up visit.   Mr. CAIN PHOENIX  was seen in April 2024  with the chief complaint of having  swelling in his  Left   groin.   He was found to have  reducible Left   inguinal hernia. he did not want to have the surgery and wanted to observe the condition.   He is returning today with a with the chief complaint of having pain   in his  Left   groin  and is worse when he   is walking or standing.  Mr. PHOENIX is stating that the swelling  is getting bigger and symptomatic. He denies any fever or  night sweats.  Appetite is good and weight is stable. Mr. PHOENIX is s/p Right inguinal hernia repair with Marlex mesh and umbilical hernia repair on 06/28/2023.

## 2025-06-20 ENCOUNTER — OUTPATIENT (OUTPATIENT)
Dept: OUTPATIENT SERVICES | Facility: HOSPITAL | Age: 75
LOS: 1 days | End: 2025-06-20
Payer: COMMERCIAL

## 2025-06-20 VITALS
TEMPERATURE: 98 F | RESPIRATION RATE: 16 BRPM | SYSTOLIC BLOOD PRESSURE: 130 MMHG | HEART RATE: 66 BPM | DIASTOLIC BLOOD PRESSURE: 78 MMHG | WEIGHT: 139.99 LBS | OXYGEN SATURATION: 99 % | HEIGHT: 64 IN

## 2025-06-20 DIAGNOSIS — Z01.818 ENCOUNTER FOR OTHER PREPROCEDURAL EXAMINATION: ICD-10-CM

## 2025-06-20 DIAGNOSIS — Z98.890 OTHER SPECIFIED POSTPROCEDURAL STATES: Chronic | ICD-10-CM

## 2025-06-20 DIAGNOSIS — Z98.41 CATARACT EXTRACTION STATUS, RIGHT EYE: Chronic | ICD-10-CM

## 2025-06-20 DIAGNOSIS — K40.90 UNILATERAL INGUINAL HERNIA, WITHOUT OBSTRUCTION OR GANGRENE, NOT SPECIFIED AS RECURRENT: ICD-10-CM

## 2025-06-20 DIAGNOSIS — I10 ESSENTIAL (PRIMARY) HYPERTENSION: ICD-10-CM

## 2025-06-20 NOTE — H&P PST ADULT - NSICDXPASTMEDICALHX_GEN_ALL_CORE_FT
PAST MEDICAL HISTORY:  Adrenal hypofunction     Chronic radicular pain of lower back     CN (constipation)     Diabetic neuropathy     DM (diabetes mellitus)     HTN (hypertension)     Lumbar herniated disc     Non-recurrent unilateral inguinal hernia without obstruction or gangrene     Non-recurrent unilateral inguinal hernia without obstruction or gangrene     Sciatica of left side     Seasonal allergies

## 2025-06-20 NOTE — H&P PST ADULT - NSANTHNECKRD_ENT_A_CORE
Pre-Procedure Note    Patient Name: Vanna Cheung   YOB: 1963  Medical Record Number: [id number]  Date: 2/28/2019      Indication: Neck and RUE pain  Consent: On file. Vital Signs:   Vitals:    04/04/19 0719   BP: (!) 129/58   Pulse: 83   Resp: 16   Temp: 97.9 °F (36.6 °C)   SpO2: 100%       Past Medical History:   has a past medical history of Herniated disc, cervical.    Past Surgical History:   has a past surgical history that includes Tubal ligation; Appendectomy; Rotator cuff repair (Left); Tonsillectomy; other surgical history (Bilateral, 03/06/2017); other surgical history (Bilateral, 05/02/2017); Nerve Surgery (Left, 08/01/2017); pr dest,paravertebral,c/t,single (Left, 8/1/2017); other surgical history (Right, 11/07/2017); pr dest,paravertebral,c/t,single (Right, 11/7/2017); other surgical history (Left, 05/03/2018); pr dstr nrolytc agnt parverteb fct sngl crvcl/thora (Left, 5/3/2018); pr dstr nrolytc agnt parverteb fct sngl crvcl/thora (Right, 7/12/2018); and pr office/outpt visit,procedure only (N/A, 11/8/2018). Sedation/ Anesthesia Plan:   MAC      Patient is an appropriate candidate for plan of sedation: yes    Preoperative Diagnosis:  C-radiculitis, C-spinal stenosis    Post-Op Dx:   As above      Procedure Performed: Cervical epidural steroid injection under fluoroscopic guidance # 2. Indication for the Procedure:  Patient failed conservative management  for pain in neck radiating to upper extremities. Patient is undergoing cervical epidural steroid injection. As patient is not responding to conservative management and interfering with activities of daily living we decided to proceed with Cervical  Epidural Steroid  injection. The procedure and risks were discussed with the patient and an informed consent was obtained. Procedure:  A meaningful communication was kept up with the patient throughout  the procedure. The patient is placed in prone position.   Skin over the
No

## 2025-06-20 NOTE — H&P PST ADULT - PROBLEM SELECTOR PLAN 2
Scheduled for left inguinal hernia repair with mesh    Pt instructed to be NPO the night before and the morning of surgery except  for po med. Escort required. Tylenol only for pain if needed.      TATYANA-3 intermediate risk      Caprini= 5

## 2025-06-20 NOTE — H&P PST ADULT - NSICDXPROCEDURE_GEN_ALL_CORE_FT
PROCEDURES:  Initial repair of reducible inguinal hernia in patient age 5 years or older 20-Jun-2025 15:45:18  Margi Swanson

## 2025-06-20 NOTE — H&P PST ADULT - NSICDXPASTSURGICALHX_GEN_ALL_CORE_FT
PAST SURGICAL HISTORY:  H/O bilateral cataract extraction     H/O hemorrhoidectomy     H/O right inguinal hernia repair     H/O: hemorrhoidectomy     History of circumcision     History of testicular mass excision     History of varicose vein stripping

## 2025-06-20 NOTE — H&P PST ADULT - ASSESSMENT
75 yr old  male history of HTN , no longer on diabetic medication well controlled had right inguinal hernia now presents with left inguinal without obstruction or gangrene not recurrence. Pt stated he had the hernia for some time and was checked by his PCP. Pt seen by surgeon and told he required  surgery. Pt schedule for left inguinal hernia repair with mesh on 2025. All history obtained with the  ##[id number]. All blood work and clearance with Echo results.     CAPRINI SCORE    AGE RELATED RISK FACTORS                                                             [ ] Age 41-60 years                                            (1 Point)  [x ] Age: 61-74 years                                           (2 Points)                 [ ] Age= 75 years                                                (3 Points)             DISEASE RELATED RISK FACTORS                                                       [ ] Edema in the lower extremities                 (1 Point)                     [ ] Varicose veins                                               (1 Point)                                 [x ] BMI > 25 Kg/m2                                            (1 Point)                                  [ ] Serious infection (ie PNA)                            (1 Point)                     [ ] Lung disease ( COPD, Emphysema)            (1 Point)                                                                          [ ] Acute myocardial infarction                         (1 Point)                  [ ] Congestive heart failure (in the previous month)  (1 Point)         [ ] Inflammatory bowel disease                            (1 Point)                  [ ] Central venous access, PICC or Port               (2 points)       (within the last month)                                                                [ ] Stroke (in the previous month)                        (5 Points)    [ ] Previous or present malignancy                       (2 points)                                                                                                                                                         HEMATOLOGY RELATED FACTORS                                                         [ ] Prior episodes of VTE                                     (3 Points)                     [ ] Positive family history for VTE                      (3 Points)                  [ ] Prothrombin 53696 A                                     (3 Points)                     [ ] Factor V Leiden                                                (3 Points)                        [ ] Lupus anticoagulants                                      (3 Points)                                                           [ ] Anticardiolipin antibodies                              (3 Points)                                                       [ ] High homocysteine in the blood                   (3 Points)                                             [ ] Other congenital or acquired thrombophilia      (3 Points)                                                [ ] Heparin induced thrombocytopenia                  (3 Points)                                        MOBILITY RELATED FACTORS  [ ] Bed rest                                                         (1 Point)  [ ] Plaster cast                                                    (2 points)  [ ] Bed bound for more than 72 hours           (2 Points)    GENDER SPECIFIC FACTORS  [ ] Pregnancy or had a baby within the last month   (1 Point)  [ ] Post-partum < 6 weeks                                   (1 Point)  [ ] Hormonal therapy  or oral contraception   (1 Point)  [ ] History of pregnancy complications              (1 point)  [ ] Unexplained or recurrent              (1 Point)    OTHER RISK FACTORS                                           (1 Point)  [ ] BMI >40, smoking, diabetes requiring insulin, chemotherapy  blood transfusions and length of surgery over 2 hours    SURGERY RELATED RISK FACTORS  [ ]  Section within the last month     (1 Point)  [ ] Minor surgery                                                  (1 Point)  [ ] Arthroscopic surgery                                       (2 Points)  [ x] Planned major surgery lasting more            (2 Points)      than 45 minutes     [ ] Elective hip or knee joint replacement       (5 points)       surgery                                                TRAUMA RELATED RISK FACTORS  [ ] Fracture of the hip, pelvis, or leg                       (5 Points)  [ ] Spinal cord injury resulting in paralysis             (5 points)       (in the previous month)    [ ] Paralysis  (less than 1 month)                             (5 Points)  [ ] Multiple Trauma within 1 month                        (5 Points)    Total Score [    5    ]    Caprini Score 0-2: Low Risk, mechanical prophylaxis (ie:compression devices)  Caprini Score 3-6: Moderate Risk , pharmacologic VTE prophylaxis is indicated for most patients (in the absence of contraindications)  Caprini Score Greater than or =7: High risk, pharmocologic VTE prophylaxis indicated for most patients (in the absence of contraindications)

## 2025-06-20 NOTE — H&P PST ADULT - HISTORY OF PRESENT ILLNESS
75 yr old  male history of HTN , no longer on diabetic medication well controlled had right inguinal hernia now presents with left inguinal without obstruction or gangrene not recurrence. Pt stated he had the hernia for some time and was checked by his PCP. Pt seen by surgeon and told he required  surgery. Pt schedule for left inguinal hernia repair with mesh on 6/25/2025. All history obtained with the  ##[id number]. All blood work and clearance with Echo results.

## 2025-06-22 PROBLEM — K42.9 UMBILICAL HERNIA WITHOUT OBSTRUCTION OR GANGRENE: Chronic | Status: INACTIVE | Noted: 2023-06-23 | Resolved: 2025-06-20

## 2025-06-23 PROCEDURE — G0463: CPT

## 2025-06-25 ENCOUNTER — APPOINTMENT (OUTPATIENT)
Dept: SURGERY | Facility: HOSPITAL | Age: 75
End: 2025-06-25

## 2025-06-25 ENCOUNTER — OUTPATIENT (OUTPATIENT)
Dept: OUTPATIENT SERVICES | Facility: HOSPITAL | Age: 75
LOS: 1 days | End: 2025-06-25
Payer: COMMERCIAL

## 2025-06-25 ENCOUNTER — TRANSCRIPTION ENCOUNTER (OUTPATIENT)
Age: 75
End: 2025-06-25

## 2025-06-25 VITALS
SYSTOLIC BLOOD PRESSURE: 191 MMHG | DIASTOLIC BLOOD PRESSURE: 95 MMHG | HEART RATE: 76 BPM | TEMPERATURE: 99 F | OXYGEN SATURATION: 99 % | WEIGHT: 139.99 LBS | HEIGHT: 64 IN | RESPIRATION RATE: 16 BRPM

## 2025-06-25 VITALS
OXYGEN SATURATION: 99 % | HEART RATE: 70 BPM | DIASTOLIC BLOOD PRESSURE: 81 MMHG | RESPIRATION RATE: 18 BRPM | SYSTOLIC BLOOD PRESSURE: 166 MMHG

## 2025-06-25 DIAGNOSIS — Z98.890 OTHER SPECIFIED POSTPROCEDURAL STATES: Chronic | ICD-10-CM

## 2025-06-25 DIAGNOSIS — K40.90 UNILATERAL INGUINAL HERNIA, WITHOUT OBSTRUCTION OR GANGRENE, NOT SPECIFIED AS RECURRENT: ICD-10-CM

## 2025-06-25 DIAGNOSIS — Z98.41 CATARACT EXTRACTION STATUS, RIGHT EYE: Chronic | ICD-10-CM

## 2025-06-25 LAB
GLUCOSE BLDC GLUCOMTR-MCNC: 127 MG/DL — HIGH (ref 70–99)
GLUCOSE BLDC GLUCOMTR-MCNC: 132 MG/DL — HIGH (ref 70–99)

## 2025-06-25 PROCEDURE — 49505 PRP I/HERN INIT REDUC >5 YR: CPT | Mod: LT

## 2025-06-25 PROCEDURE — 49505 PRP I/HERN INIT REDUC >5 YR: CPT | Mod: AS,LT

## 2025-06-25 PROCEDURE — 82962 GLUCOSE BLOOD TEST: CPT

## 2025-06-25 PROCEDURE — C1781: CPT

## 2025-06-25 DEVICE — MESH HERNIA MARLEX 3 X 6": Type: IMPLANTABLE DEVICE | Site: LEFT | Status: FUNCTIONAL

## 2025-06-25 RX ORDER — LABETALOL HYDROCHLORIDE 200 MG/1
200 TABLET, FILM COATED ORAL ONCE
Refills: 0 | Status: COMPLETED | OUTPATIENT
Start: 2025-06-25 | End: 2025-06-25

## 2025-06-25 RX ORDER — OXYCODONE HYDROCHLORIDE 30 MG/1
1 TABLET ORAL
Qty: 8 | Refills: 0
Start: 2025-06-25 | End: 2025-06-26

## 2025-06-25 RX ORDER — GABAPENTIN 400 MG/1
1 CAPSULE ORAL
Refills: 0 | DISCHARGE

## 2025-06-25 RX ORDER — HYDROMORPHONE/SOD CHLOR,ISO/PF 2 MG/10 ML
0.5 SYRINGE (ML) INJECTION
Refills: 0 | Status: DISCONTINUED | OUTPATIENT
Start: 2025-06-25 | End: 2025-06-25

## 2025-06-25 RX ORDER — MAGNESIUM CARBONATE 54 MG/5 ML
0 LIQUID (ML) ORAL
Refills: 0 | DISCHARGE

## 2025-06-25 RX ORDER — HEPARIN SODIUM 1000 [USP'U]/ML
5000 INJECTION INTRAVENOUS; SUBCUTANEOUS ONCE
Refills: 0 | Status: COMPLETED | OUTPATIENT
Start: 2025-06-25 | End: 2025-06-25

## 2025-06-25 RX ORDER — ONDANSETRON HCL/PF 4 MG/2 ML
4 VIAL (ML) INJECTION ONCE
Refills: 0 | Status: DISCONTINUED | OUTPATIENT
Start: 2025-06-25 | End: 2025-06-25

## 2025-06-25 RX ORDER — SODIUM CHLORIDE 9 G/1000ML
1000 INJECTION, SOLUTION INTRAVENOUS
Refills: 0 | Status: DISCONTINUED | OUTPATIENT
Start: 2025-06-25 | End: 2025-06-25

## 2025-06-25 RX ORDER — HYDROMORPHONE/SOD CHLOR,ISO/PF 2 MG/10 ML
1 SYRINGE (ML) INJECTION
Refills: 0 | Status: DISCONTINUED | OUTPATIENT
Start: 2025-06-25 | End: 2025-06-25

## 2025-06-25 RX ADMIN — Medication 1 APPLICATION(S): at 12:35

## 2025-06-25 RX ADMIN — Medication 3 MILLILITER(S): at 12:24

## 2025-06-25 RX ADMIN — LABETALOL HYDROCHLORIDE 200 MILLIGRAM(S): 200 TABLET, FILM COATED ORAL at 14:56

## 2025-06-25 NOTE — ASU DISCHARGE PLAN (ADULT/PEDIATRIC) - ***IN THE EVENT THAT YOU DEVELOP A COMPLICATION AND YOU ARE UNABLE TO REACH YOUR OWN PHYSICIAN, YOU MAY CONTACT:
[FreeTextEntry3] : Documented by Fidelina Boo acting as a scribe for Jim Coburn on 12/12/2024   All medical record entries made by the Scribe were at my, Dr. Jim Coburn direction and personally dictated by me on 12/12/2024 . I have reviewed the chart and agree that the record accurately reflects my personal performance of the history, physical exam, assessment and plan. I have also personally directed, reviewed, and agreed with the chart. Statement Selected

## 2025-06-25 NOTE — BRIEF OPERATIVE NOTE - ESTIMATED BLOOD LOSS
5 change of breathing pattern/oral hygiene/position upright (90Y)/cough/gurgly voice/fever/pneumonia/throat clearing/upper respiratory infection change of breathing pattern/oral hygiene/position upright (90Y)/cough/gurgly voice/fever/pneumonia/throat clearing/upper respiratory infection change of breathing pattern/oral hygiene/position upright (90Y)/cough/gurgly voice/fever/pneumonia/throat clearing/upper respiratory infection change of breathing pattern/oral hygiene/position upright (90Y)/cough/gurgly voice/fever/pneumonia/throat clearing/upper respiratory infection

## 2025-06-25 NOTE — ASU DISCHARGE PLAN (ADULT/PEDIATRIC) - CARE PROVIDER_API CALL
Ming Ordaz  Surgery (General Surgery)  [address]  [place] [address]  Phone: [phone]  Fax: [phone]  Follow Up Time:

## 2025-06-25 NOTE — ASU DISCHARGE PLAN (ADULT/PEDIATRIC) - NS MD DC FALL RISK RISK
For information on Fall & Injury Prevention, visit: [email or web address] OR  [email or web address] OR  https://www.cdc.gov/steadi/patient.html

## 2025-06-25 NOTE — ASU DISCHARGE PLAN (ADULT/PEDIATRIC) - FINANCIAL ASSISTANCE
[facility] provides services at a reduced cost to those who are determined to be eligible through [facility]’s financial assistance program. Information regarding [facility]’s financial assistance program can be found by going to [email or web address] or by calling [phone].

## 2025-06-25 NOTE — ASU PATIENT PROFILE, ADULT - FALL HARM RISK - UNIVERSAL INTERVENTIONS
Bed in lowest position, wheels locked, appropriate side rails in place/Call bell, personal items and telephone in reach/Instruct patient to call for assistance before getting out of bed or chair/Non-slip footwear when patient is out of bed/[place] to call system/Physically safe environment - no spills, clutter or unnecessary equipment/Purposeful Proactive Rounding/Room/bathroom lighting operational, light cord in reach

## 2025-06-25 NOTE — ASU PATIENT PROFILE, ADULT - NSCAFFEINETYPE_GEN_ALL_CORE_SD
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 pt and fmily sts he got it out. hasn't been doing his cough therapy s much recently. pt is frustrated with it. no fevers, cp. no other acute or new changes from baseline. pt in nad, airway int, neck sup, ctab, rrr. +profound gen dec motor tone/weakness (baseline per family). will check labs, cxr.
water/coffee

## 2025-06-27 PROBLEM — K40.90 UNILATERAL INGUINAL HERNIA, WITHOUT OBSTRUCTION OR GANGRENE, NOT SPECIFIED AS RECURRENT: Chronic | Status: ACTIVE | Noted: 2025-06-20

## 2025-07-01 ENCOUNTER — NON-APPOINTMENT (OUTPATIENT)
Age: 75
End: 2025-07-01

## 2025-07-10 ENCOUNTER — APPOINTMENT (OUTPATIENT)
Dept: SURGERY | Facility: CLINIC | Age: 75
End: 2025-07-10
Payer: MEDICARE

## 2025-07-10 ENCOUNTER — APPOINTMENT (OUTPATIENT)
Dept: SURGERY | Facility: CLINIC | Age: 75
End: 2025-07-10

## 2025-07-10 PROCEDURE — 99024 POSTOP FOLLOW-UP VISIT: CPT

## 2025-07-10 NOTE — ASSESSMENT
[FreeTextEntry1] : Mr. PHOENIX is doing well, with excellent post-operative recovery. All surgical incisions are healing well and as expected. There is no evidence of infection or complication, and he is progressing as expected. Post-operative wound care, activity, restrictions and precautions reinforced. Patient instructed to refrain from any heavy lifting greater than 10-15 pounds for at least 4-6 weeks post-operatively. Patient's questions and concerns addressed to patient's satisfaction.

## 2025-07-10 NOTE — PHYSICAL EXAM
[Alert] : alert [Oriented to Person] : oriented to person [Oriented to Place] : oriented to place [Oriented to Time] : oriented to time [Calm] : calm [de-identified] : He  is alert, well-groomed, and in NAD\par    [de-identified] : anicteric.  Nasal mucosa pink, septum midline. Oral mucosa pink.  Tongue midline, Pharynx without exudates.\par    [de-identified] : left groin Surgical wound is healing well.   no signs of  inflammation or infection. No penile discharge or lesions.  No scrotal swelling or discoloration. Testes descended bilaterally, smooth, without masses. Epididymis non-tender.

## 2025-07-10 NOTE — HISTORY OF PRESENT ILLNESS
[de-identified] : Mr. PHOENIX  is s/p left inguinal hernia repair with mesh on 06/25/2025.  Today  Mr. PHOENIX offers no complaints. patient reports no fever or  chills. patient reports occasional discomfort in the surgical area.  His surgical incisions are healing well. No signs of inflammation, infection or exudate. patient reports good bowel movements and appetite.

## (undated) DEVICE — DRAPE LIGHT HANDLE COVER (BLUE)

## (undated) DEVICE — DRSG TEGADERM 4 X 4.75"

## (undated) DEVICE — VENODYNE/SCD SLEEVE CALF MEDIUM

## (undated) DEVICE — WARMING BLANKET UPPER ADULT

## (undated) DEVICE — SUT POLYSORB 4-0 27" P-12 UNDYED

## (undated) DEVICE — PREP CHLORAPREP HI-LITE ORANGE 26ML

## (undated) DEVICE — SUT POLYSORB 3-0 30" V-20 UNDYED

## (undated) DEVICE — NDL HYPO SAFE 25G X 1.5" (ORANGE)

## (undated) DEVICE — DRSG MASTISOL

## (undated) DEVICE — SUT POLYSORB 2-0 18" TIES UNDYED

## (undated) DEVICE — DRAPE LAPAROTOMY W POUCHES

## (undated) DEVICE — GLV 7 PROTEXIS (WHITE)

## (undated) DEVICE — SOL IRR POUR NS 0.9% 1500ML

## (undated) DEVICE — DRAPE 1/2 SHEET 40X57"

## (undated) DEVICE — PACK MINOR NO DRAPE

## (undated) DEVICE — GLV 7.5 PROTEXIS (WHITE)

## (undated) DEVICE — DRSG CURITY GAUZE SPONGE 4 X 4" 12-PLY

## (undated) DEVICE — ELCTR GROUNDING PAD ADULT COVIDIEN

## (undated) DEVICE — SUT SURGIPRO 2-0 30" GS-22

## (undated) DEVICE — ELCTR BOVIE PENCIL SMOKE EVACUATION

## (undated) DEVICE — DRAIN PENROSE 5/8" X 18" LATEX

## (undated) DEVICE — SUT POLYSORB 2-0 30" V-20 UNDYED

## (undated) DEVICE — FOR-ESU VALLEYLAB T7E15009DX: Type: DURABLE MEDICAL EQUIPMENT

## (undated) DEVICE — SUT SURGIPRO II 1 30" GS-25

## (undated) DEVICE — SUT POLYSORB 3-0 36" GS-21

## (undated) DEVICE — SUT POLYSORB 0 36" GS-25